# Patient Record
Sex: MALE | Race: WHITE | NOT HISPANIC OR LATINO | Employment: FULL TIME | URBAN - METROPOLITAN AREA
[De-identification: names, ages, dates, MRNs, and addresses within clinical notes are randomized per-mention and may not be internally consistent; named-entity substitution may affect disease eponyms.]

---

## 2018-07-10 ENCOUNTER — OFFICE VISIT (OUTPATIENT)
Dept: URGENT CARE | Facility: CLINIC | Age: 28
End: 2018-07-10
Payer: COMMERCIAL

## 2018-07-10 VITALS
DIASTOLIC BLOOD PRESSURE: 65 MMHG | TEMPERATURE: 98 F | HEART RATE: 54 BPM | RESPIRATION RATE: 18 BRPM | HEIGHT: 72 IN | OXYGEN SATURATION: 98 % | WEIGHT: 148 LBS | BODY MASS INDEX: 20.05 KG/M2 | SYSTOLIC BLOOD PRESSURE: 110 MMHG

## 2018-07-10 DIAGNOSIS — M25.571 ACUTE RIGHT ANKLE PAIN: Primary | ICD-10-CM

## 2018-07-10 PROCEDURE — 99204 OFFICE O/P NEW MOD 45 MIN: CPT | Mod: S$GLB,,, | Performed by: NURSE PRACTITIONER

## 2018-07-10 PROCEDURE — 3008F BODY MASS INDEX DOCD: CPT | Mod: CPTII,S$GLB,, | Performed by: NURSE PRACTITIONER

## 2018-07-10 NOTE — PATIENT INSTRUCTIONS
Ankle Fracture    You have an ankle fracture. This means that one or more of the bones that make up the ankle joint are broken. This causes pain, swelling, and sometimes bruising.  A fracture is treated with a splint or cast or special boot. It will take about 4 to 6 weeks for the fracture to heal. Surgery may be needed to fix severe injuries.  Home care  · You will be given a splint, cast or boot to prevent movement at the ankle joint. Unless you were told otherwise, use crutches or a walker. Dont weight on the injured leg until cleared by your healthcare provider to do so. Crutches and walkers can be rented at many pharmacies and surgical or orthopedic supply stores. Dont put weight on a splint. It will break.  · Keep your leg elevated to reduce pain and swelling. When sleeping, place a pillow under the injured leg. When sitting, support the injured leg so it is level with your waist. This is very important during the first 48 hours.  · Apply an ice pack over the injured area for no more than 15 to 20 minutes. Do this every 3 to 6 hours for the first 24 to 48 hours. Continue with ice packs 3 to 4 times a day for the next 2 days, then as needed to ease pain and swelling. To make an ice pack, put ice cubes in a plastic bag that seals at the top. Wrap the bag in a clean, thin towel or cloth. Never put ice or an ice pack directly on the skin. You can place the ice pack directly over the cast or splint. As the ice melts, be careful that the cast or splint doesnt get wet.  · Keep the cast, splint, or boot completely dry at all times. Bathe with your cast, splint, or boot out of the water, protected with 2 large plastic bags. Place 1 bag outside of the other. Tape each bag with duct tape at the top end. Water can still leak in. So it's best to keep the cast, splint, or boot away from water. If a boot or fiberglass cast or splint gets wet, dry it with a hair dryer on a cool setting.  · You may use over-the-counter  pain medicine to control pain, unless another pain medicine was prescribed. Talk with your provider beforeusing these medicines if you have chronic liver or kidney disease or ever had a stomach ulcer or GI bleeding.  Follow-up care  Follow up with your healthcare provider in 1 week, or as advised. This is to be sure the bone is healing properly. If you were given a splint, it may be changed to a cast at your follow-up visit.  If X-rays were taken, you will be told of any new findings that may affect your care.  When to seek medical advice  Call your healthcare provider right away if any of these occur:  · The plaster cast or splint becomes wet or soft  · The fiberglass cast or splint stays wet for more than 24 hours  · There is increased tightness, sore areas, or pain under the cast or splint  · Your toes become swollen, cold, blue, numb, or tingly  · The cast becomes loose  · The cast has a bad smell  · The cast develops cracks or breaks   Date Last Reviewed: 11/23/2015 © 2000-2017 [employer]. [address] [place] [address], [place] [address]. All rights reserved. This information is not intended as a substitute for professional medical care. Always follow your healthcare professional's instructions.

## 2018-07-10 NOTE — PROGRESS NOTES
Subjective:       Patient ID: Arthur Spaulding is a 27 y.o. male.    Vitals:  height is 6' (1.829 m) and weight is 67.1 kg (148 lb). His temperature is 98.1 °F (36.7 °C). His blood pressure is 110/65 and his pulse is 54 (abnormal). His respiration is 18 and oxygen saturation is 98%.     Chief Complaint: Ankle Pain    Pt c/o right ankle pain over 1 week; states playing volleyball & both feet went in front of him & landed on his butt. Unsure how he landed on right foot but has been unable to put weight on it since. Pt states he went out of town right after it happened & has been using a friend's crutches & walking boot. Is supposed to go out of town again       Ankle Pain    The incident occurred 5 to 7 days ago. [place] the park. The injury mechanism is unknown. The pain is present in the right ankle. The quality of the pain is described as aching and shooting. The pain is at a severity of 5/10. The pain is moderate. The pain has been intermittent since onset. Associated symptoms include an inability to bear weight and a loss of motion. He reports no foreign bodies present. The symptoms are aggravated by movement and weight bearing. He has tried NSAIDs and ice for the symptoms. The treatment provided mild relief.     Review of Systems   Constitution: Negative for chills and fever.   HENT: Negative for sore throat.    Eyes: Negative for blurred vision.   Cardiovascular: Negative for chest pain.   Respiratory: Negative for shortness of breath.    Skin: Negative for rash.   Musculoskeletal: Positive for joint pain and joint swelling. Negative for back pain.   Gastrointestinal: Negative for abdominal pain, diarrhea, nausea and vomiting.   Neurological: Negative for headaches.   Psychiatric/Behavioral: The patient is not nervous/anxious.        Objective:      Physical Exam   Constitutional: He is oriented to person, place, and time. He appears well-developed and well-nourished. He is cooperative.  Non-toxic  appearance. He does not appear ill. No distress.   HENT:   Head: Normocephalic and atraumatic.   Right Ear: Hearing, tympanic membrane and ear canal normal.   Left Ear: Hearing, tympanic membrane and ear canal normal.   Nose: No mucosal edema, rhinorrhea or nasal deformity. No epistaxis. Right sinus exhibits no maxillary sinus tenderness and no frontal sinus tenderness. Left sinus exhibits no maxillary sinus tenderness and no frontal sinus tenderness.   Mouth/Throat: Uvula is midline and mucous membranes are normal. No trismus in the jaw. Normal dentition. No uvula swelling. No posterior oropharyngeal erythema.   Eyes: Conjunctivae and lids are normal. Right eye exhibits no discharge. Left eye exhibits no discharge. No scleral icterus.   Sclera clear bilat   Neck: Trachea normal, normal range of motion, full passive range of motion without pain and phonation normal. Neck supple.   Cardiovascular: Normal rate, regular rhythm, normal heart sounds, intact distal pulses and normal pulses.    Pulmonary/Chest: Effort normal and breath sounds normal. No respiratory distress.   Abdominal: Soft. Normal appearance and bowel sounds are normal. He exhibits no distension, no pulsatile midline mass and no mass. There is no tenderness.   Musculoskeletal: He exhibits edema and tenderness. He exhibits no deformity.        Right ankle: He exhibits decreased range of motion, swelling and ecchymosis. Tenderness. Achilles tendon exhibits no pain and normal Pantoja's test results.        Feet:    Neurological: He is alert and oriented to person, place, and time. He exhibits normal muscle tone. Coordination normal.   Skin: Skin is warm, dry and intact. He is not diaphoretic. No pallor.   Psychiatric: He has a normal mood and affect. His speech is normal and behavior is normal. Judgment and thought content normal. Cognition and memory are normal.   Nursing note and vitals reviewed.      X-ray Ankle Complete Right    Result Date:  7/10/2018  EXAMINATION: XR ANKLE COMPLETE 3 VIEW RIGHT CLINICAL HISTORY: Pain in right ankle and joints of right foot TECHNIQUE: AP, lateral, and oblique images of the right ankle were performed. COMPARISON: None FINDINGS: There is a linear calcific density along the medial aspect of the tibial metaphysis.  This may represent an avulsion fracture though there is no minimal soft tissue swelling.  There is some soft tissue swelling about the lateral malleolus.  No fracture of the lateral malleolus seen.  Ankle mortise appears intact.     There is soft tissue swelling lateral more so than medial.  Curvilinear calcification abutting the medial distal tibial metaphysis may represent an avulsion fracture.  Mild cortical irregularity about the posterior malleolus on the lateral projection as well.  Findings concerning for nondisplaced fracture.  Orthopedic consultation suggested. Electronically signed by: Italo Canales MD Date:    07/10/2018 Time:    14:48    X-ray Foot Complete Right    Result Date: 7/10/2018  EXAMINATION: XR FOOT COMPLETE 3 VIEW RIGHT CLINICAL HISTORY: . Pain in right ankle and joints of right foot TECHNIQUE: AP, lateral, and oblique views of the right foot were performed. COMPARISON: None FINDINGS: There is soft tissue swelling about the lateral malleolus.  Bones are well mineralized and alignment is satisfactory.  No acute fracture seen.  Ankle films may be of benefit.     See above Electronically signed by: Italo Canales MD Date:    07/10/2018 Time:    14:17    Assessment:       1. Acute right ankle pain        Plan:       Possible fracture vs tear  Patient Instructions     Ankle Fracture    You have an ankle fracture. This means that one or more of the bones that make up the ankle joint are broken. This causes pain, swelling, and sometimes bruising.  A fracture is treated with a splint or cast or special boot. It will take about 4 to 6 weeks for the fracture to heal. Surgery may be needed to fix  severe injuries.  Home care  · You will be given a splint, cast or boot to prevent movement at the ankle joint. Unless you were told otherwise, use crutches or a walker. Dont weight on the injured leg until cleared by your healthcare provider to do so. Crutches and walkers can be rented at many pharmacies and surgical or orthopedic supply stores. Dont put weight on a splint. It will break.  · Keep your leg elevated to reduce pain and swelling. When sleeping, place a pillow under the injured leg. When sitting, support the injured leg so it is level with your waist. This is very important during the first 48 hours.  · Apply an ice pack over the injured area for no more than 15 to 20 minutes. Do this every 3 to 6 hours for the first 24 to 48 hours. Continue with ice packs 3 to 4 times a day for the next 2 days, then as needed to ease pain and swelling. To make an ice pack, put ice cubes in a plastic bag that seals at the top. Wrap the bag in a clean, thin towel or cloth. Never put ice or an ice pack directly on the skin. You can place the ice pack directly over the cast or splint. As the ice melts, be careful that the cast or splint doesnt get wet.  · Keep the cast, splint, or boot completely dry at all times. Bathe with your cast, splint, or boot out of the water, protected with 2 large plastic bags. Place 1 bag outside of the other. Tape each bag with duct tape at the top end. Water can still leak in. So it's best to keep the cast, splint, or boot away from water. If a boot or fiberglass cast or splint gets wet, dry it with a hair dryer on a cool setting.  · You may use over-the-counter pain medicine to control pain, unless another pain medicine was prescribed. Talk with your provider beforeusing these medicines if you have chronic liver or kidney disease or ever had a stomach ulcer or GI bleeding.  Follow-up care  Follow up with your healthcare provider in 1 week, or as advised. This is to be sure the bone is  healing properly. If you were given a splint, it may be changed to a cast at your follow-up visit.  If X-rays were taken, you will be told of any new findings that may affect your care.  When to seek medical advice  Call your healthcare provider right away if any of these occur:  · The plaster cast or splint becomes wet or soft  · The fiberglass cast or splint stays wet for more than 24 hours  · There is increased tightness, sore areas, or pain under the cast or splint  · Your toes become swollen, cold, blue, numb, or tingly  · The cast becomes loose  · The cast has a bad smell  · The cast develops cracks or breaks   Date Last Reviewed: 11/23/2015  © 8616-4365 [employer]. [address] [place] [address]. All rights reserved. This information is not intended as a substitute for professional medical care. Always follow your healthcare professional's instructions.              Acute right ankle pain  -     X-Ray Foot Complete Right; Future; Expected date: 07/10/2018  -     Ambulatory referral to Orthopedics  -     X-Ray Ankle Complete Right; Future; Expected date: 07/10/2018  -     [facility] - OTHER

## 2018-07-12 ENCOUNTER — TELEPHONE (OUTPATIENT)
Dept: URGENT CARE | Facility: CLINIC | Age: 28
End: 2018-07-12

## 2018-07-12 NOTE — TELEPHONE ENCOUNTER
Pt returned my call. States he spoke with ortho & has an appointment set up with them for follow-up.

## 2018-07-24 ENCOUNTER — OFFICE VISIT (OUTPATIENT)
Dept: ORTHOPEDICS | Facility: CLINIC | Age: 28
End: 2018-07-24
Payer: COMMERCIAL

## 2018-07-24 ENCOUNTER — HOSPITAL ENCOUNTER (OUTPATIENT)
Dept: RADIOLOGY | Facility: HOSPITAL | Age: 28
Discharge: HOME | End: 2018-07-24
Attending: PHYSICIAN ASSISTANT
Payer: COMMERCIAL

## 2018-07-24 VITALS — HEIGHT: 72 IN

## 2018-07-24 DIAGNOSIS — S93.401A SPRAIN OF RIGHT ANKLE, UNSPECIFIED LIGAMENT, INITIAL ENCOUNTER: ICD-10-CM

## 2018-07-24 DIAGNOSIS — M25.571 RIGHT ANKLE PAIN, UNSPECIFIED CHRONICITY: ICD-10-CM

## 2018-07-24 DIAGNOSIS — M25.571 RIGHT ANKLE PAIN, UNSPECIFIED CHRONICITY: Primary | ICD-10-CM

## 2018-07-24 PROCEDURE — 99999 PR PBB SHADOW E&M-EST. PATIENT-LVL III: CPT | Mod: PBBFAC,,, | Performed by: PHYSICIAN ASSISTANT

## 2018-07-24 PROCEDURE — 73610 X-RAY EXAM OF ANKLE: CPT | Mod: 26,RT,, | Performed by: RADIOLOGY

## 2018-07-24 PROCEDURE — 73610 X-RAY EXAM OF ANKLE: CPT | Mod: TC,RT

## 2018-07-24 PROCEDURE — 99203 OFFICE O/P NEW LOW 30 MIN: CPT | Mod: S$GLB,,, | Performed by: PHYSICIAN ASSISTANT

## 2018-07-24 RX ORDER — MELOXICAM 15 MG/1
15 TABLET ORAL DAILY
Qty: 30 TABLET | Refills: 0 | Status: SHIPPED | OUTPATIENT
Start: 2018-07-24 | End: 2018-08-23

## 2018-07-24 NOTE — LETTER
July 24, 2018      So Melissa, NP  [address] [address]           [facility]  [address], [address]  [facility] [address]  Phone: [phone]          Patient: Arthur Spaulding   MR Number: [id number]   YOB: 1990   Date of Visit: 7/24/2018       Dear So Melissa:    Thank you for referring Arthur Spaulding to me for evaluation. Attached you will find relevant portions of my assessment and plan of care.    If you have questions, please do not hesitate to call me. I look forward to following Arthur Spaulding along with you.    Sincerely,    Jessica Norris PA-C    Enclosure  CC:  No Recipients    If you would like to receive this communication electronically, please contact [email or web address] or [phone] to request more information on [facility] access.    For providers and/or their staff who would like to refer a patient to [facility], please contact us through our one-stop-shop provider referral line, [facility], at [phone].    If you feel you have received this communication in error or would no longer like to receive these types of communications, please e-mail [email or web address]

## 2018-07-24 NOTE — PROGRESS NOTES
Subjective:      Patient ID: Arthur Spaulding is a 27 y.o. male.    Chief Complaint: No chief complaint on file.    HPI  27 year old male presents with chief complaint of right ankle pain since 7/4/18. He was playing volleyball when he jumped up and when he landed his foot may have twisted. He had pain with WB and swelling. He went to urgent care about 5 days later and had x-rays taken. He was told there was a fracture. He has been WBAT in a boot and uses crutches prn. Pain is at the lateral ankle. It is worse when he turns his foot or if he is on it for too long. Tylenol and ibuprofen prn give some relief. He says his pain and swelling have gotten way better.   Review of Systems   Constitution: Negative for chills, fever and night sweats.   Cardiovascular: Negative for chest pain.   Respiratory: Negative for cough and shortness of breath.    Hematologic/Lymphatic: Does not bruise/bleed easily.   Skin: Negative for color change.   Gastrointestinal: Negative for heartburn.   Genitourinary: Negative for dysuria.   Neurological: Negative for numbness and paresthesias.   Psychiatric/Behavioral: Negative for altered mental status.   Allergic/Immunologic: Negative for persistent infections.         Objective:            General    Vitals reviewed.  Constitutional: He is oriented to person, place, and time. He appears well-developed and well-nourished.   Cardiovascular: Normal rate.    Neurological: He is alert and oriented to person, place, and time.         Right Ankle/Foot Exam     Inspection   Erythema: absent    Tenderness   The patient is tender to palpation of the ATF.    Range of Motion   Ankle Joint   Dorsiflexion: abnormal   Plantar flexion: abnormal   Subtalar Joint   Inversion: abnormal   Eversion: abnormal   Pantoja Test:  negative    Tests   Squeeze Test: negative    Other   Sensation: normal    Comments:  Mild swelling at ankle.         Vascular Exam     Right Pulses  Dorsalis Pedis:      2+              X-ray:  ordered and reviewed by myself. No acute fx or dislocation seen.         Assessment:       Encounter Diagnoses   Name Primary?    Right ankle pain, unspecified chronicity Yes    Sprain of right ankle, unspecified ligament, initial encounter           Plan:       Patient will continue WBAT in the boot. Work on ROM as tolerated. Ice and elevate. Prescription for mobic sent to pharmacy. RTC in 2 weeks for re-evaluation.

## 2019-03-18 ENCOUNTER — OFFICE VISIT (OUTPATIENT)
Dept: URGENT CARE | Facility: CLINIC | Age: 29
End: 2019-03-18

## 2019-03-18 VITALS
RESPIRATION RATE: 18 BRPM | HEIGHT: 72 IN | TEMPERATURE: 98 F | BODY MASS INDEX: 20.05 KG/M2 | OXYGEN SATURATION: 100 % | HEART RATE: 65 BPM | SYSTOLIC BLOOD PRESSURE: 121 MMHG | WEIGHT: 148 LBS | DIASTOLIC BLOOD PRESSURE: 76 MMHG

## 2019-03-18 DIAGNOSIS — L42 PITYRIASIS ROSEA: Primary | ICD-10-CM

## 2019-03-18 PROCEDURE — 99214 OFFICE O/P EST MOD 30 MIN: CPT | Mod: S$GLB,,, | Performed by: NURSE PRACTITIONER

## 2019-03-18 PROCEDURE — 99214 PR OFFICE/OUTPT VISIT, EST, LEVL IV, 30-39 MIN: ICD-10-PCS | Mod: S$GLB,,, | Performed by: NURSE PRACTITIONER

## 2019-03-18 RX ORDER — LEVOCETIRIZINE DIHYDROCHLORIDE 5 MG/1
5 TABLET, FILM COATED ORAL NIGHTLY
Qty: 30 TABLET | Refills: 0 | Status: SHIPPED | OUTPATIENT
Start: 2019-03-18 | End: 2022-12-24

## 2019-03-18 RX ORDER — HYDROXYZINE HYDROCHLORIDE 25 MG/1
25 TABLET, FILM COATED ORAL 3 TIMES DAILY
Qty: 30 TABLET | Refills: 0 | Status: SHIPPED | OUTPATIENT
Start: 2019-03-18 | End: 2021-07-20

## 2019-03-18 RX ORDER — METHYLPREDNISOLONE 4 MG/1
TABLET ORAL
Qty: 21 TABLET | Refills: 0 | Status: SHIPPED | OUTPATIENT
Start: 2019-03-18 | End: 2021-07-20

## 2019-03-18 RX ORDER — HYDROCORTISONE 25 MG/G
CREAM TOPICAL 2 TIMES DAILY
Qty: 3.5 G | Refills: 1 | Status: SHIPPED | OUTPATIENT
Start: 2019-03-18 | End: 2021-07-20

## 2019-03-18 NOTE — PATIENT INSTRUCTIONS
Understanding Pityriasis Rosea    Pityriasis rosea is a type of skin rash. It starts with one large round or oval scaly patch called the herald patch, and then causes many more small patches. The rash most often appears on the chest, back, and belly. It can take 1 to 2 months to go away. But once its gone, it doesnt come back.  How to say it  [id number]   What causes pityriasis rosea?  The cause is not yet known but experts think it may be from a virus. The rash happens most often in people ages 10 to 35, and in pregnant women. If you are pregnant, make sure to tell your healthcare provider about your rash.  Symptoms of pityriasis rosea  In some people, the rash shows up 1 to 2 weeks after symptoms such as headache, sore throat, nausea, stuffy nose, and fever. The rash often starts with one large scaly patch in the shape of a [place] or oval. The patch may be pink or red if you have pale skin. It may be purple, brown, or gray if you have darker skin. It can be 1 to 2 inches wide or larger. It usually appears on the chest or back. This is called a herald or mother patch.  Smaller patches then show up in 1 to 2 weeks on the chest, back, belly, arms, and legs. It can also show up on the neck and face. The rash can form the shape of a [place] tree on your back. The patches may itch, especially if your skin gets warmer during exercise or a hot shower. You may also feel tired and achy.  Treatment for pityriasis rosea  The rash should go away without treatment, but it can take 4 to 8 weeks or longer. Once the rash goes away, it doesnt come back.  You can treat your itching with any of these:  · Corticosteroid cream or ointment. You can apply this medicine to the rash 2 to 3 times a day, for up to 3 weeks.  · Calamine lotion. This is a pink, watery lotion that can help stop itching.  · Antihistamine. This medicine can help reduce itching. You can put it on the skin as a cream or take it by mouth as a  pill.  · Other anti-itch lotion or cream. Ask your healthcare provider about other anti-itch lotion or cream that can help relieve itching. He or she may prescribe a stronger medicine if drugstore medicine isnt helping you.  If you have severe symptoms, your healthcare provider may treat you with any of the below:  · Prednisone. This is an oral steroid medicine. It can help relieve severe itching if needed.  · Acyclovir. This is a type of anti-virus medicine. It may help the rash go away sooner in some people.  · Ultraviolet light treatment. Exposing the skin to ultraviolet light in the first week can help lessen symptoms.  When to call your healthcare provider  Call your healthcare provider right away if you have any of these:  · New symptoms  · Rash that lasts for more than 3 months  · Symptoms that dont get better in 1 to 2 months, or get worse   Date Last Reviewed: 5/1/2016  © 8734-5320 [employer]. [address] [place] [address]. All rights reserved. This information is not intended as a substitute for professional medical care. Always follow your healthcare professional's instructions.        Self-Care for Skin Rashes     Pat your skin dry. Do not rub.     When your skin reacts to a substance your body is sensitive to, it can cause a rash. You can treat most rashes at home by keeping the skin clean and dry. Many rashes may get better on their own within 2 to 3 days. You may need medical attention if your rash itches, drains, or hurts, particularly if the rash is getting worse.  What can cause a skin rash?  · Sun poisoning, caused by too much exposure to the sun  · An irritant or allergic reaction to a certain type of food, plant, or chemical, such as  shellfish, poison ivy, and or cleaning products  · An infection caused by a fungus (ringworm), virus (chickenpox), or bacteria (strep)  · Bites or infestation caused by insects or pests, such as ticks, lice, or mites  · Dry skin, which  is often seen during the winter months and in older people  How can I control itching and skin damage?  · Take soothing lukewarm baths in a colloidal oatmeal product. You can buy this at the [employer].  · Do your best not to scratch. Clip fingernails short, especially in young children, to reduce skin damage if scratching does occur.  · Use moisturizing skin lotion instead of scratching your dry skin.  · Use sunscreen whenever going out into direct sun.  · Use only mild cleansing agents whenever possible.  · Wash with mild, nonirritating soap and warm water.  · Wear clothing that breathes, such as cotton shirts or canvas shoes.  · If fluid is seeping from the rash, cover it loosely with clean gauze to absorb the discharge.  · Many rashes are contagious. Prevent the rash from spreading to others by washing your hands often before or after touching others with any skin rash.  Use medicine  · Antihistamines such as diphenhydramine can help control itching. But use with caution because they can make you drowsy.  · Using over-the-counter hydrocortisone cream on small rashes may help reduce swelling and itching  · Most over-the-counter antifungal medicines can treat athletes foot and many other fungal infections of the skin.  Check with your healthcare provider  Call your healthcare provider if:  · You were told that you have a fungal infection on your skin to make sure you have the correct type of medicine.  · You have questions or concerns about medicines or their side effects.     Call 911  Call 911 if either of these occur:  · Your tongue or lips start to swell  · You have difficulty breathing      Call your healthcare provider  Call your healthcare provider if any of these occur:  · Temperature of more than 101.0°F (38.3°C), or as directed  · Sore throat, a cough, or unusual fatigue  · Red, oozy, or painful rash gets worse. These are signs of infection.  · Rash covers your face, genitals, or most of your  body  · Crusty sores or red rings that begin to spread  · You were exposed to someone who has a contagious rash, such as scabies or lice.  · Red bulls-eye rash with a white center (a sign of Lyme disease)  · You were told that you have resistant bacteria (MRSA) on your skin.   Date Last Reviewed: 5/12/2015  © 6261-2041 [employer]. [address] [place] [address], [place] [address]. All rights reserved. This information is not intended as a substitute for professional medical care. Always follow your healthcare professional's instructions.        Pityriasis Rosea  This is a harmless non-contagious rash. The exact cause is unknown. It is not an allergic reaction, and does not seem to be caused by a viral or fungal infection. Although anyone can get it, it is most often seen in children and young adults ages 10 to 35.  Pityriasis usually resolves on its own without treatment in 2 weeks.  In some people it may take 6 to 8 weeks to clear up.   Home care  · For dry skin, use a moisturizing cream. For itchiness, use 1% hydrocortisone cream (no prescription needed) or calamine lotion 2 to 3 times a day.  · Exposure to natural sunlight helps some people. It may help fade the rash, but doesn't seem to help the itching. Don't overdo it in the sun, as your skin may be more sensitive than usual. You dont want to burn yourself. Artificial sun lamps, sun beds, and tanning salons are not recommended.  · This condition is not contagious. Your child may attend  or school while the rash is present.  Medicines  Talk with your healthcare provider before using any medicines. All medicines have side effects.  · Medicines will not get rid of the rash.   · Moisturizing skin creams may help.  · Antihistamines may help with itching, but they can make you sleepy.  · Topical steroids are sometimes used.  Follow-up care  Follow up with your healthcare provider, or as advised. Call your provider if the itching is not controlled by  the above suggestions, or if the rash lasts longer than 3 months.  When to seek medical advice  Call your healthcare provider right away if any of these occur:  · You develop a rash and are pregnant  · Severe itching  · Signs of infection in the skin (increasing redness, drainage of pus, yellow-brown scabs)  · Fever or other symptoms of a new illness  Date Last Reviewed: 8/1/2016  © 0333-7999 [employer]. [address] [place] [address], [place] [address]. All rights reserved. This information is not intended as a substitute for professional medical care. Always follow your healthcare professional's instructions.      Please return here or go to the Emergency Department for any concerns or worsening of condition.  If you were prescribed antibiotics, please take them to completion.  If you were prescribed a narcotic medication, do not drive or operate heavy equipment or machinery while taking these medications.  Please follow up with your primary care doctor or specialist as needed.    If you  smoke, please stop smoking.

## 2019-03-18 NOTE — LETTER
March 18, 2019      [facility] [address] [place] [address]  Phone: [phone]  Fax: [phone]       Patient: Arthur Spaulding   YOB: 1990  Date of Visit: 03/18/2019    To Whom It May Concern:    Baldev Spaulding  was at [facility] on 03/18/2019. He may return to work/school on 03/19/19 with no restrictions. If you have any questions or concerns, or if I can be of further assistance, please do not hesitate to contact me.    Sincerely,    Sergio Lyn NP

## 2019-03-18 NOTE — PROGRESS NOTES
Subjective:       Patient ID: Arthur Spaulding is a 28 y.o. male.    Vitals:  height is 6' (1.829 m) and weight is 67.1 kg (148 lb). His temperature is 97.9 °F (36.6 °C). His blood pressure is 121/76 and his pulse is 65. His respiration is 18 and oxygen saturation is 100%.     Chief Complaint: Rash (started 2-3 months)    Mother patch on his right chest      Rash   This is a recurrent problem. Episode onset: 2-3 months. The problem is unchanged. The affected locations include the chest and back. The rash is characterized by itchiness. He was exposed to nothing. Pertinent negatives include no cough, fever or sore throat. Past treatments include nothing.       Constitution: Negative for chills and fever.   HENT: Negative for facial swelling and sore throat.    Neck: Negative for painful lymph nodes.   Eyes: Negative for eye itching and eyelid swelling.   Respiratory: Negative for cough.    Musculoskeletal: Negative for joint pain and joint swelling.   Skin: Positive for rash. Negative for color change, pale, wound, abrasion, laceration, lesion, skin thickening/induration, puncture wound, erythema, abscess, avulsion and hives.   Allergic/Immunologic: Positive for itching. Negative for environmental allergies, immunocompromised state and hives.   Hematologic/Lymphatic: Negative for swollen lymph nodes.       Objective:      Physical Exam   Constitutional: He is oriented to person, place, and time. He appears well-developed and well-nourished.   HENT:   Head: Normocephalic and atraumatic. Head is without abrasion, without contusion and without laceration.   Right Ear: External ear normal.   Left Ear: External ear normal.   Nose: Nose normal.   Mouth/Throat: Oropharynx is clear and moist.   Eyes: Conjunctivae, EOM and lids are normal. Pupils are equal, round, and reactive to light.   Neck: Trachea normal, full passive range of motion without pain and phonation normal. Neck supple.   Cardiovascular: Normal rate, regular rhythm  and normal heart sounds.   Pulmonary/Chest: Effort normal and breath sounds normal. No stridor. No respiratory distress.   Musculoskeletal: Normal range of motion.   Neurological: He is alert and oriented to person, place, and time.   Skin: Skin is warm, dry and intact. Capillary refill takes less than 2 seconds. Rash noted. No abrasion, no bruising, no burn, no ecchymosis, no laceration and no lesion noted. No erythema.        Psychiatric: He has a normal mood and affect. His speech is normal and behavior is normal. Judgment and thought content normal. Cognition and memory are normal.   Nursing note and vitals reviewed.      Assessment:       1. Pityriasis rosea        Plan:         Pityriasis rosea  -     Ambulatory referral to Dermatology    Other orders  -     methylPREDNISolone (MEDROL DOSEPACK) 4 mg tablet; use as directed  Dispense: 21 tablet; Refill: 0  -     hydrOXYzine HCl (ATARAX) 25 MG tablet; Take 1 tablet (25 mg total) by mouth 3 (three) times daily.  Dispense: 30 tablet; Refill: 0  -     hydrocortisone 2.5 % cream; Apply topically 2 (two) times daily.  Dispense: 3.5 g; Refill: 1  -     levocetirizine (XYZAL) 5 MG tablet; Take 1 tablet (5 mg total) by mouth every evening.  Dispense: 30 tablet; Refill: 0  -     calamine lotion; Apply topically as needed.; Refill: 0        Understanding Pityriasis Rosea    Pityriasis rosea is a type of skin rash. It starts with one large round or oval scaly patch called the herald patch, and then causes many more small patches. The rash most often appears on the chest, back, and belly. It can take 1 to 2 months to go away. But once its gone, it doesnt come back.  How to say it  [id number]-sis RO-[id number]   What causes pityriasis rosea?  The cause is not yet known but experts think it may be from a virus. The rash happens most often in people ages 10 to 35, and in pregnant women. If you are pregnant, make sure to tell your healthcare provider about your rash.  Symptoms  of pityriasis rosea  In some people, the rash shows up 1 to 2 weeks after symptoms such as headache, sore throat, nausea, stuffy nose, and fever. The rash often starts with one large scaly patch in the shape of a [place] or oval. The patch may be pink or red if you have pale skin. It may be purple, brown, or gray if you have darker skin. It can be 1 to 2 inches wide or larger. It usually appears on the chest or back. This is called a herald or mother patch.  Smaller patches then show up in 1 to 2 weeks on the chest, back, belly, arms, and legs. It can also show up on the neck and face. The rash can form the shape of a [place] tree on your back. The patches may itch, especially if your skin gets warmer during exercise or a hot shower. You may also feel tired and achy.  Treatment for pityriasis rosea  The rash should go away without treatment, but it can take 4 to 8 weeks or longer. Once the rash goes away, it doesnt come back.  You can treat your itching with any of these:  · Corticosteroid cream or ointment. You can apply this medicine to the rash 2 to 3 times a day, for up to 3 weeks.  · Calamine lotion. This is a pink, watery lotion that can help stop itching.  · Antihistamine. This medicine can help reduce itching. You can put it on the skin as a cream or take it by mouth as a pill.  · Other anti-itch lotion or cream. Ask your healthcare provider about other anti-itch lotion or cream that can help relieve itching. He or she may prescribe a stronger medicine if drugstore medicine isnt helping you.  If you have severe symptoms, your healthcare provider may treat you with any of the below:  · Prednisone. This is an oral steroid medicine. It can help relieve severe itching if needed.  · Acyclovir. This is a type of anti-virus medicine. It may help the rash go away sooner in some people.  · Ultraviolet light treatment. Exposing the skin to ultraviolet light in the first week can help lessen symptoms.  When to call  your healthcare provider  Call your healthcare provider right away if you have any of these:  · New symptoms  · Rash that lasts for more than 3 months  · Symptoms that dont get better in 1 to 2 months, or get worse   Date Last Reviewed: 5/1/2016  © 7243-6569 [employer]. [address] [place] [address]. All rights reserved. This information is not intended as a substitute for professional medical care. Always follow your healthcare professional's instructions.        Self-Care for Skin Rashes     Pat your skin dry. Do not rub.     When your skin reacts to a substance your body is sensitive to, it can cause a rash. You can treat most rashes at home by keeping the skin clean and dry. Many rashes may get better on their own within 2 to 3 days. You may need medical attention if your rash itches, drains, or hurts, particularly if the rash is getting worse.  What can cause a skin rash?  · Sun poisoning, caused by too much exposure to the sun  · An irritant or allergic reaction to a certain type of food, plant, or chemical, such as  shellfish, poison ivy, and or cleaning products  · An infection caused by a fungus (ringworm), virus (chickenpox), or bacteria (strep)  · Bites or infestation caused by insects or pests, such as ticks, lice, or mites  · Dry skin, which is often seen during the winter months and in older people  How can I control itching and skin damage?  · Take soothing lukewarm baths in a colloidal oatmeal product. You can buy this at the [employer].  · Do your best not to scratch. Clip fingernails short, especially in young children, to reduce skin damage if scratching does occur.  · Use moisturizing skin lotion instead of scratching your dry skin.  · Use sunscreen whenever going out into direct sun.  · Use only mild cleansing agents whenever possible.  · Wash with mild, nonirritating soap and warm water.  · Wear clothing that breathes, such as cotton shirts or canvas shoes.  · If  fluid is seeping from the rash, cover it loosely with clean gauze to absorb the discharge.  · Many rashes are contagious. Prevent the rash from spreading to others by washing your hands often before or after touching others with any skin rash.  Use medicine  · Antihistamines such as diphenhydramine can help control itching. But use with caution because they can make you drowsy.  · Using over-the-counter hydrocortisone cream on small rashes may help reduce swelling and itching  · Most over-the-counter antifungal medicines can treat athletes foot and many other fungal infections of the skin.  Check with your healthcare provider  Call your healthcare provider if:  · You were told that you have a fungal infection on your skin to make sure you have the correct type of medicine.  · You have questions or concerns about medicines or their side effects.     Call 911  Call 911 if either of these occur:  · Your tongue or lips start to swell  · You have difficulty breathing      Call your healthcare provider  Call your healthcare provider if any of these occur:  · Temperature of more than 101.0°F (38.3°C), or as directed  · Sore throat, a cough, or unusual fatigue  · Red, oozy, or painful rash gets worse. These are signs of infection.  · Rash covers your face, genitals, or most of your body  · Crusty sores or red rings that begin to spread  · You were exposed to someone who has a contagious rash, such as scabies or lice.  · Red bulls-eye rash with a white center (a sign of Lyme disease)  · You were told that you have resistant bacteria (MRSA) on your skin.   Date Last Reviewed: 5/12/2015  © 9112-1003 [employer]. [address] [place] [address]. All rights reserved. This information is not intended as a substitute for professional medical care. Always follow your healthcare professional's instructions.        Pityriasis Rosea  This is a harmless non-contagious rash. The exact cause is unknown. It is  not an allergic reaction, and does not seem to be caused by a viral or fungal infection. Although anyone can get it, it is most often seen in children and young adults ages 10 to 35.  Pityriasis usually resolves on its own without treatment in 2 weeks.  In some people it may take 6 to 8 weeks to clear up.   Home care  · For dry skin, use a moisturizing cream. For itchiness, use 1% hydrocortisone cream (no prescription needed) or calamine lotion 2 to 3 times a day.  · Exposure to natural sunlight helps some people. It may help fade the rash, but doesn't seem to help the itching. Don't overdo it in the sun, as your skin may be more sensitive than usual. You dont want to burn yourself. Artificial sun lamps, sun beds, and tanning salons are not recommended.  · This condition is not contagious. Your child may attend  or school while the rash is present.  Medicines  Talk with your healthcare provider before using any medicines. All medicines have side effects.  · Medicines will not get rid of the rash.   · Moisturizing skin creams may help.  · Antihistamines may help with itching, but they can make you sleepy.  · Topical steroids are sometimes used.  Follow-up care  Follow up with your healthcare provider, or as advised. Call your provider if the itching is not controlled by the above suggestions, or if the rash lasts longer than 3 months.  When to seek medical advice  Call your healthcare provider right away if any of these occur:  · You develop a rash and are pregnant  · Severe itching  · Signs of infection in the skin (increasing redness, drainage of pus, yellow-brown scabs)  · Fever or other symptoms of a new illness  Date Last Reviewed: 8/1/2016  © 1918-4219 [employer]. [address] [place] [address], [place] [address]. All rights reserved. This information is not intended as a substitute for professional medical care. Always follow your healthcare professional's instructions.      Please return here  or go to the Emergency Department for any concerns or worsening of condition.  If you were prescribed antibiotics, please take them to completion.  If you were prescribed a narcotic medication, do not drive or operate heavy equipment or machinery while taking these medications.  Please follow up with your primary care doctor or specialist as needed.    If you  smoke, please stop smoking.

## 2020-11-04 ENCOUNTER — OFFICE VISIT (OUTPATIENT)
Dept: URGENT CARE | Facility: CLINIC | Age: 30
End: 2020-11-04
Payer: COMMERCIAL

## 2020-11-04 VITALS
SYSTOLIC BLOOD PRESSURE: 119 MMHG | TEMPERATURE: 98 F | BODY MASS INDEX: 20.05 KG/M2 | OXYGEN SATURATION: 96 % | DIASTOLIC BLOOD PRESSURE: 96 MMHG | HEART RATE: 76 BPM | WEIGHT: 148 LBS | RESPIRATION RATE: 16 BRPM | HEIGHT: 72 IN

## 2020-11-04 DIAGNOSIS — J40 BRONCHITIS: ICD-10-CM

## 2020-11-04 DIAGNOSIS — R05.9 COUGH: Primary | ICD-10-CM

## 2020-11-04 LAB
CTP QC/QA: YES
SARS-COV-2 RDRP RESP QL NAA+PROBE: NEGATIVE

## 2020-11-04 PROCEDURE — U0002: ICD-10-PCS | Mod: QW,S$GLB,, | Performed by: STUDENT IN AN ORGANIZED HEALTH CARE EDUCATION/TRAINING PROGRAM

## 2020-11-04 PROCEDURE — U0002 COVID-19 LAB TEST NON-CDC: HCPCS | Mod: QW,S$GLB,, | Performed by: STUDENT IN AN ORGANIZED HEALTH CARE EDUCATION/TRAINING PROGRAM

## 2020-11-04 PROCEDURE — 99214 PR OFFICE/OUTPT VISIT, EST, LEVL IV, 30-39 MIN: ICD-10-PCS | Mod: S$GLB,,, | Performed by: STUDENT IN AN ORGANIZED HEALTH CARE EDUCATION/TRAINING PROGRAM

## 2020-11-04 PROCEDURE — 99214 OFFICE O/P EST MOD 30 MIN: CPT | Mod: S$GLB,,, | Performed by: STUDENT IN AN ORGANIZED HEALTH CARE EDUCATION/TRAINING PROGRAM

## 2020-11-04 RX ORDER — ALBUTEROL SULFATE 90 UG/1
2-4 AEROSOL, METERED RESPIRATORY (INHALATION) EVERY 6 HOURS PRN
Qty: 8 G | Refills: 0 | Status: SHIPPED | OUTPATIENT
Start: 2020-11-04 | End: 2020-12-04

## 2020-11-04 RX ORDER — MONTELUKAST SODIUM 10 MG/1
10 TABLET ORAL NIGHTLY
COMMUNITY

## 2020-11-04 NOTE — PROGRESS NOTES
Subjective:       Patient ID: Arthur Spaulding is a 30 y.o. male.    Vitals:  height is 6' (1.829 m) and weight is 67.1 kg (148 lb). His temperature is 97.6 °F (36.4 °C). His blood pressure is 119/96 (abnormal) and his pulse is 76. His respiration is 16 and oxygen saturation is 96%.     Chief Complaint: Cough    Pt presents for cough/wheezing with COVID concerns. Reports ~5d of cough with associated intermittent wheezing, congestion, and diarrhea. Has had to use rescue inhaler over the weekend but states it ran out Monday. Denied f/c, n/v, abd pain, or sick contacts.    Cough  This is a new problem. The current episode started in the past 7 days. The problem has been unchanged. The problem occurs hourly. The cough is non-productive. Associated symptoms include shortness of breath (with wheezing) and wheezing. Pertinent negatives include no chest pain, chills, ear congestion, ear pain, eye redness, fever, headaches, myalgias, rash or sore throat. Nothing aggravates the symptoms. He has tried a beta-agonist inhaler (Xyzal) for the symptoms. The treatment provided mild relief. His past medical history is significant for asthma.       Constitution: Negative for chills, sweating, fatigue and fever.   HENT: Positive for congestion. Negative for ear pain, sinus pain, sinus pressure, sore throat and voice change.    Neck: Negative for neck pain and painful lymph nodes.   Cardiovascular: Negative for chest pain, palpitations and sob on exertion.   Eyes: Negative for eye discharge, eye itching and eye redness.   Respiratory: Positive for cough, shortness of breath (with wheezing), wheezing and asthma. Negative for chest tightness, sputum production and stridor.    Gastrointestinal: Positive for diarrhea. Negative for abdominal pain, nausea and vomiting.   Musculoskeletal: Negative for joint pain, joint swelling and muscle ache.   Skin: Negative for color change, rash and lesion.   Allergic/Immunologic: Positive for asthma.  Negative for seasonal allergies.   Neurological: Negative for dizziness, light-headedness and headaches.   Hematologic/Lymphatic: Negative for swollen lymph nodes.   Psychiatric/Behavioral: Negative for confusion, agitation and sleep disturbance.       Objective:      Physical Exam   Constitutional: He is oriented to person, place, and time. He appears well-developed. He does not appear ill. No distress.   HENT:   Head: Normocephalic and atraumatic.   Ears:   Right Ear: External ear normal.   Left Ear: External ear normal.   Eyes: Conjunctivae and EOM are normal. Right eye exhibits no discharge. Left eye exhibits no discharge. No scleral icterus.   Neck: Normal range of motion. Neck supple.   Cardiovascular: Normal rate, regular rhythm and normal heart sounds. Exam reveals no gallop and no friction rub.   No murmur heard.  Pulmonary/Chest: Effort normal and breath sounds normal. No respiratory distress. He has no wheezes. He has no rhonchi. He has no rales.   Abdominal: Soft. Bowel sounds are normal. He exhibits no distension. There is no abdominal tenderness.   Musculoskeletal: Normal range of motion.   Neurological: He is alert and oriented to person, place, and time. No cranial nerve deficit (CN II-XII grossly intact).   Skin: Skin is warm, dry and no rash. Psychiatric: His behavior is normal. Judgment and thought content normal.   Nursing note and vitals reviewed.    Recent Lab Results       11/04/20  1705         Acceptable Yes     SARS-CoV-2 RNA, Amplification, Qual Negative             Assessment:       1. Cough    2. Bronchitis        Plan:         Cough  -     POCT COVID-19 Rapid Screening  - discussed negative result with patient. Counseled Symptomatic patient without known exposure to continue home quarantine/isolation per CDC guidelines in event of false negative rapid COVID test    Bronchitis  -     albuterol (PROVENTIL/VENTOLIN HFA) 90 mcg/actuation inhaler; Inhale 2-4 puffs into the  lungs every 6 (six) hours as needed for Wheezing or Shortness of Breath.  Dispense: 8 g; Refill: 0    Results, medications and diagnosis reviewed with patient, questions answered, and return precautions given    Follow up if symptoms worsen or fail to improve.    Gunner Arguello MD/MPH  [facility]  [facility]

## 2020-11-04 NOTE — PATIENT INSTRUCTIONS
You may leave home and/or return to work when the following conditions are met:   24 hours fever free without fever-reducing medications AND   Improved symptoms    Additional instructions:  · Social distance per your local guidelines  · Call ahead before visiting your doctor.  · Wear a facemask when around others who do not live in your household.  · Cover your coughs and sneezes.  · Wash your hands often with soap and water; hand  can be used, too.

## 2020-11-04 NOTE — LETTER
[address] [place] [address] ? [place], [address] ? Phone [phone] ? Fax [phone]           Return to Work/School    Patient: Arthur Spaulding  YOB: 1990   Date: 11/04/2020      To Whom It May Concern:     Arthur Spaulding was in contact with/seen in my office on 11/04/2020. COVID-19 is present in our communities across the state. Not all patients are eligible or appropriate to be tested. In this situation, your employee meets the following criteria:     Arthur Spaulding has met the criteria for COVID-19 testing and has a NEGATIVE result. The employee can return to work once their symptoms have improved and they are without fever for 24 hours without the use of fever reducing medications (Tylenol, Motrin, etc).     If you have any questions or concerns, or if I can be of further assistance, please do not hesitate to contact me.     Sincerely,    Gunner Arguello MD

## 2020-11-30 ENCOUNTER — OFFICE VISIT (OUTPATIENT)
Dept: URGENT CARE | Facility: CLINIC | Age: 30
End: 2020-11-30
Payer: COMMERCIAL

## 2020-11-30 VITALS
HEART RATE: 64 BPM | HEIGHT: 72 IN | DIASTOLIC BLOOD PRESSURE: 67 MMHG | OXYGEN SATURATION: 99 % | BODY MASS INDEX: 20.32 KG/M2 | SYSTOLIC BLOOD PRESSURE: 115 MMHG | WEIGHT: 150 LBS | TEMPERATURE: 99 F

## 2020-11-30 DIAGNOSIS — R11.2 NAUSEA AND VOMITING, INTRACTABILITY OF VOMITING NOT SPECIFIED, UNSPECIFIED VOMITING TYPE: Primary | ICD-10-CM

## 2020-11-30 LAB
CTP QC/QA: YES
SARS-COV-2 RDRP RESP QL NAA+PROBE: NEGATIVE

## 2020-11-30 PROCEDURE — 3008F PR BODY MASS INDEX (BMI) DOCUMENTED: ICD-10-PCS | Mod: CPTII,S$GLB,, | Performed by: NURSE PRACTITIONER

## 2020-11-30 PROCEDURE — 99213 OFFICE O/P EST LOW 20 MIN: CPT | Mod: S$GLB,CS,, | Performed by: NURSE PRACTITIONER

## 2020-11-30 PROCEDURE — 99213 PR OFFICE/OUTPT VISIT, EST, LEVL III, 20-29 MIN: ICD-10-PCS | Mod: S$GLB,CS,, | Performed by: NURSE PRACTITIONER

## 2020-11-30 PROCEDURE — 3008F BODY MASS INDEX DOCD: CPT | Mod: CPTII,S$GLB,, | Performed by: NURSE PRACTITIONER

## 2020-11-30 PROCEDURE — U0002 COVID-19 LAB TEST NON-CDC: HCPCS | Mod: QW,S$GLB,, | Performed by: NURSE PRACTITIONER

## 2020-11-30 PROCEDURE — U0002: ICD-10-PCS | Mod: QW,S$GLB,, | Performed by: NURSE PRACTITIONER

## 2020-11-30 RX ORDER — ONDANSETRON 4 MG/1
4 TABLET, ORALLY DISINTEGRATING ORAL EVERY 8 HOURS PRN
Qty: 15 TABLET | Refills: 0 | Status: SHIPPED | OUTPATIENT
Start: 2020-11-30 | End: 2021-07-20

## 2020-11-30 NOTE — PATIENT INSTRUCTIONS
"Return to Urgent Care or go to ER if symptoms worsen or fail to improve.  Follow up with PCP as recommended for further management.       Vomiting (Adult)  Vomiting is a common symptom that may be due to different causes. These include gastroenteritis ("stomach flu"), food poisoning and gastritis. There are other more serious causes of vomiting which may be hard to diagnose early in the illness. Therefore, it is important to watch for the warning signs listed below.  The main danger from repeated vomiting is dehydration. This is due to excess loss of water and minerals from the body. When this occurs, body fluids must be replaced.  Home care  · If symptoms are severe, rest at home for the next 24 hours.  · Because your symptoms may be from an infection, wash your hands frequently and well, and use alcohol-based  to avoid spreading the infection to others.  · Wash your hands for at least 20 seconds. Hum the happy birthday song twice for the correct length of time.  · Wash your hands after using the toilet, before and after preparing food, before eating food, after changing a diaper, cleaning a wound, caring for a sick person, and blowing your nose, coughing, or sneezing. You should also wash your hands after caring for someone who is sick, touching pet food, or treats, and touching an animal, or animal waste.  · You may use acetaminophen or NSAID medicines like ibuprofen or naproxen to control fever, unless another medicine was prescribed. If you have chronic liver or kidney disease or ever had a stomach ulcer or GI bleeding, talk with your doctor before using these medicines. Aspirin should never be used in anyone under 18 years of age who is ill with a fever. It may cause severe liver damage. Don't use NSAID medicines if you are already taking one for another condition (like arthritis) or are on aspirin (such as for heart disease, or after a stroke)  · Avoid tobacco and alcohol use, which may worsen your " symptoms.  · If medicines for vomiting were prescribed, take as directed.  · Once vomiting stops, then follow these guidelines:  During the first 12 to 24 hours follow the diet below:  · Fruit juices. Apple, grape juice, clear fruit drinks, and electrolyte replacement drinks.  · Beverages. Soft drinks without caffeine; mineral water (plain or flavored), decaffeinated tea and coffee.  · Soups. Clear broth, consommé and bouillon  · Desserts. Plain gelatin, popsicles and fruit juice bars. As you feel better, you may add 6-8 ounces of yogurt per day.  During the next 24 hours you may add the following to the above:  · Hot cereal, plain toast, bread, rolls, crackers  · Plain noodles, rice, mashed potatoes, chicken noodle or rice soup  · Unsweetened canned fruit (avoid pineapple), bananas  · Limit caffeine and chocolate. No spices or seasonings except salt.  During the next 24 hours:  Gradually resume a normal diet, as you feel better and your symptoms lessen.  Follow-up care  Follow up with your healthcare provider, or as advised.  When to seek medical advice  Call your healthcare provider right away if any of these occur:  · Constant right-sided lower abdominal pain or increasing general abdominal pain  · Continued vomiting (unable to keep liquids down) for 24 hours  · Frequent diarrhea (more than 5 times a day); blood (red or black color) or mucus in diarrhea  · Reduced urine output or extreme thirst  · Weakness, dizziness or fainting  · Unusually drowsy or confused  · Fever of 100.4°F (38°C) oral or higher, or as directed  · Yellow color of the eyes or skin  Date Last Reviewed: 11/16/2015  © 4216-6308 [employer]. [address] [place] [address], [place] [address]. All rights reserved. This information is not intended as a substitute for professional medical care. Always follow your healthcare professional's instructions.

## 2020-11-30 NOTE — PROGRESS NOTES
Subjective:       Patient ID: Arthur Spaulding is a 30 y.o. male.    Vitals:  height is 6' (1.829 m) and weight is 68 kg (150 lb). His temperature is 98.7 °F (37.1 °C). His blood pressure is 115/67 and his pulse is 64. His oxygen saturation is 99%.     Chief Complaint: Nausea    Nausea  This is a new problem. The current episode started today. The problem has been unchanged. Associated symptoms include myalgias and nausea. Pertinent negatives include no abdominal pain, anorexia, arthralgias, change in bowel habit, chest pain, chills, congestion, coughing, diaphoresis, fatigue, fever, headaches, joint swelling, neck pain, numbness, rash, sore throat, swollen glands, urinary symptoms, vertigo, visual change, vomiting or weakness. He has tried nothing for the symptoms.       Constitution: Negative for chills, sweating, fatigue and fever.   HENT: Negative for congestion and sore throat.    Neck: Negative for neck pain and painful lymph nodes.   Cardiovascular: Negative for chest pain and leg swelling.   Eyes: Negative for double vision and blurred vision.   Respiratory: Negative for cough and shortness of breath.    Gastrointestinal: Positive for nausea. Negative for abdominal pain, vomiting and diarrhea.   Genitourinary: Negative for dysuria, frequency and urgency.   Musculoskeletal: Positive for muscle ache. Negative for joint pain, joint swelling and muscle cramps.   Skin: Negative for color change, pale and rash.   Allergic/Immunologic: Negative for seasonal allergies.   Neurological: Negative for dizziness, history of vertigo, light-headedness, passing out, headaches and numbness.   Hematologic/Lymphatic: Negative for swollen lymph nodes, easy bruising/bleeding and history of blood clots. Does not bruise/bleed easily.   Psychiatric/Behavioral: Negative for nervous/anxious, sleep disturbance and depression. The patient is not nervous/anxious.        Objective:      Physical Exam   Constitutional: He is oriented to  person, place, and time.  Non-toxic appearance. He does not appear ill. No distress.   HENT:   Head: Normocephalic and atraumatic.   Eyes: Conjunctivae are normal. extraocular movement intact  Pulmonary/Chest: Effort normal. No stridor. No respiratory distress.   Abdominal: He exhibits no distension. There is no abdominal tenderness.   Neurological: He is alert and oriented to person, place, and time.   Skin: Skin is not diaphoretic. Psychiatric: His behavior is normal.   Nursing note and vitals reviewed.        Results for orders placed or performed in visit on 11/30/20   POCT COVID-19 Rapid Screening   Result Value Ref Range    POC Rapid COVID Negative Negative     Acceptable Yes        Assessment:       1. Nausea and vomiting, intractability of vomiting not specified, unspecified vomiting type        Plan:         Nausea and vomiting, intractability of vomiting not specified, unspecified vomiting type  -     POCT COVID-19 Rapid Screening  -     ondansetron (ZOFRAN-ODT) 4 MG TbDL; Take 1 tablet (4 mg total) by mouth every 8 (eight) hours as needed (nausea).  Dispense: 15 tablet; Refill: 0

## 2021-01-02 ENCOUNTER — CLINICAL SUPPORT (OUTPATIENT)
Dept: URGENT CARE | Facility: CLINIC | Age: 31
End: 2021-01-02
Payer: COMMERCIAL

## 2021-01-02 DIAGNOSIS — Z11.59 SCREENING FOR VIRAL DISEASE: Primary | ICD-10-CM

## 2021-01-02 LAB
CTP QC/QA: YES
SARS-COV-2 RDRP RESP QL NAA+PROBE: NEGATIVE

## 2021-01-02 PROCEDURE — U0002: ICD-10-PCS | Mod: QW,S$GLB,, | Performed by: NURSE PRACTITIONER

## 2021-01-02 PROCEDURE — U0002 COVID-19 LAB TEST NON-CDC: HCPCS | Mod: QW,S$GLB,, | Performed by: NURSE PRACTITIONER

## 2021-01-29 ENCOUNTER — CLINICAL SUPPORT (OUTPATIENT)
Dept: URGENT CARE | Facility: CLINIC | Age: 31
End: 2021-01-29
Payer: COMMERCIAL

## 2021-01-29 DIAGNOSIS — Z11.59 SCREENING FOR VIRAL DISEASE: Primary | ICD-10-CM

## 2021-01-29 LAB
CTP QC/QA: YES
SARS-COV-2 RDRP RESP QL NAA+PROBE: NEGATIVE

## 2021-01-29 PROCEDURE — U0002 COVID-19 LAB TEST NON-CDC: HCPCS | Mod: QW,S$GLB,, | Performed by: NURSE PRACTITIONER

## 2021-01-29 PROCEDURE — U0002: ICD-10-PCS | Mod: QW,S$GLB,, | Performed by: NURSE PRACTITIONER

## 2021-02-04 ENCOUNTER — CLINICAL SUPPORT (OUTPATIENT)
Dept: URGENT CARE | Facility: CLINIC | Age: 31
End: 2021-02-04
Payer: COMMERCIAL

## 2021-02-04 DIAGNOSIS — Z11.9 ENCOUNTER FOR SCREENING EXAMINATION FOR INFECTIOUS DISEASE: Primary | ICD-10-CM

## 2021-02-04 LAB
CTP QC/QA: YES
SARS-COV-2 RDRP RESP QL NAA+PROBE: NEGATIVE

## 2021-02-04 PROCEDURE — U0002: ICD-10-PCS | Mod: QW,S$GLB,, | Performed by: INTERNAL MEDICINE

## 2021-02-04 PROCEDURE — U0002 COVID-19 LAB TEST NON-CDC: HCPCS | Mod: QW,S$GLB,, | Performed by: INTERNAL MEDICINE

## 2021-04-26 ENCOUNTER — PATIENT MESSAGE (OUTPATIENT)
Dept: RESEARCH | Facility: HOSPITAL | Age: 31
End: 2021-04-26

## 2021-07-17 ENCOUNTER — OFFICE VISIT (OUTPATIENT)
Dept: URGENT CARE | Facility: CLINIC | Age: 31
End: 2021-07-17
Payer: COMMERCIAL

## 2021-07-17 VITALS
TEMPERATURE: 99 F | BODY MASS INDEX: 20.32 KG/M2 | RESPIRATION RATE: 18 BRPM | OXYGEN SATURATION: 99 % | WEIGHT: 150 LBS | HEART RATE: 64 BPM | DIASTOLIC BLOOD PRESSURE: 71 MMHG | SYSTOLIC BLOOD PRESSURE: 110 MMHG | HEIGHT: 72 IN

## 2021-07-17 DIAGNOSIS — N50.811 TESTICULAR PAIN, RIGHT: Primary | ICD-10-CM

## 2021-07-17 LAB
BILIRUB UR QL STRIP: NEGATIVE
GLUCOSE UR QL STRIP: NEGATIVE
KETONES UR QL STRIP: NEGATIVE
LEUKOCYTE ESTERASE UR QL STRIP: NEGATIVE
PH, POC UA: 6 (ref 5–8)
POC BLOOD, URINE: NEGATIVE
POC NITRATES, URINE: NEGATIVE
PROT UR QL STRIP: NEGATIVE
SP GR UR STRIP: 1.02 (ref 1–1.03)
UROBILINOGEN UR STRIP-ACNC: NORMAL (ref 0.3–2.2)

## 2021-07-17 PROCEDURE — 87591 N.GONORRHOEAE DNA AMP PROB: CPT | Performed by: NURSE PRACTITIONER

## 2021-07-17 PROCEDURE — 3008F BODY MASS INDEX DOCD: CPT | Mod: CPTII,S$GLB,, | Performed by: NURSE PRACTITIONER

## 2021-07-17 PROCEDURE — 81003 URINALYSIS AUTO W/O SCOPE: CPT | Mod: QW,S$GLB,, | Performed by: NURSE PRACTITIONER

## 2021-07-17 PROCEDURE — 81003 POCT URINALYSIS, DIPSTICK, AUTOMATED, W/O SCOPE: ICD-10-PCS | Mod: QW,S$GLB,, | Performed by: NURSE PRACTITIONER

## 2021-07-17 PROCEDURE — 87491 CHLMYD TRACH DNA AMP PROBE: CPT | Performed by: NURSE PRACTITIONER

## 2021-07-17 PROCEDURE — 99214 PR OFFICE/OUTPT VISIT, EST, LEVL IV, 30-39 MIN: ICD-10-PCS | Mod: 25,S$GLB,, | Performed by: NURSE PRACTITIONER

## 2021-07-17 PROCEDURE — 3008F PR BODY MASS INDEX (BMI) DOCUMENTED: ICD-10-PCS | Mod: CPTII,S$GLB,, | Performed by: NURSE PRACTITIONER

## 2021-07-17 PROCEDURE — 1125F AMNT PAIN NOTED PAIN PRSNT: CPT | Mod: S$GLB,,, | Performed by: NURSE PRACTITIONER

## 2021-07-17 PROCEDURE — 99214 OFFICE O/P EST MOD 30 MIN: CPT | Mod: 25,S$GLB,, | Performed by: NURSE PRACTITIONER

## 2021-07-17 PROCEDURE — 87086 URINE CULTURE/COLONY COUNT: CPT | Performed by: NURSE PRACTITIONER

## 2021-07-17 PROCEDURE — 1125F PR PAIN SEVERITY QUANTIFIED, PAIN PRESENT: ICD-10-PCS | Mod: S$GLB,,, | Performed by: NURSE PRACTITIONER

## 2021-07-18 LAB — BACTERIA UR CULT: NO GROWTH

## 2021-07-20 ENCOUNTER — OFFICE VISIT (OUTPATIENT)
Dept: UROLOGY | Facility: CLINIC | Age: 31
End: 2021-07-20
Payer: COMMERCIAL

## 2021-07-20 VITALS
BODY MASS INDEX: 20.34 KG/M2 | SYSTOLIC BLOOD PRESSURE: 103 MMHG | WEIGHT: 150 LBS | DIASTOLIC BLOOD PRESSURE: 71 MMHG | HEART RATE: 67 BPM

## 2021-07-20 DIAGNOSIS — N50.811 TESTICULAR PAIN, RIGHT: Primary | ICD-10-CM

## 2021-07-20 PROCEDURE — 1126F PR PAIN SEVERITY QUANTIFIED, NO PAIN PRESENT: ICD-10-PCS | Mod: CPTII,S$GLB,, | Performed by: NURSE PRACTITIONER

## 2021-07-20 PROCEDURE — 1126F AMNT PAIN NOTED NONE PRSNT: CPT | Mod: CPTII,S$GLB,, | Performed by: NURSE PRACTITIONER

## 2021-07-20 PROCEDURE — 99203 OFFICE O/P NEW LOW 30 MIN: CPT | Mod: S$GLB,,, | Performed by: NURSE PRACTITIONER

## 2021-07-20 PROCEDURE — 3008F BODY MASS INDEX DOCD: CPT | Mod: CPTII,S$GLB,, | Performed by: NURSE PRACTITIONER

## 2021-07-20 PROCEDURE — 3008F PR BODY MASS INDEX (BMI) DOCUMENTED: ICD-10-PCS | Mod: CPTII,S$GLB,, | Performed by: NURSE PRACTITIONER

## 2021-07-20 PROCEDURE — 99203 PR OFFICE/OUTPT VISIT, NEW, LEVL III, 30-44 MIN: ICD-10-PCS | Mod: S$GLB,,, | Performed by: NURSE PRACTITIONER

## 2021-07-21 ENCOUNTER — TELEPHONE (OUTPATIENT)
Dept: URGENT CARE | Facility: CLINIC | Age: 31
End: 2021-07-21

## 2021-07-22 LAB
C TRACH DNA SPEC QL NAA+PROBE: NOT DETECTED
N GONORRHOEA DNA SPEC QL NAA+PROBE: NOT DETECTED

## 2021-07-25 ENCOUNTER — TELEPHONE (OUTPATIENT)
Dept: URGENT CARE | Facility: CLINIC | Age: 31
End: 2021-07-25

## 2021-07-26 ENCOUNTER — TELEPHONE (OUTPATIENT)
Dept: URGENT CARE | Facility: CLINIC | Age: 31
End: 2021-07-26

## 2021-12-30 ENCOUNTER — OFFICE VISIT (OUTPATIENT)
Dept: URGENT CARE | Facility: CLINIC | Age: 31
End: 2021-12-30
Payer: COMMERCIAL

## 2021-12-30 VITALS
BODY MASS INDEX: 20.32 KG/M2 | RESPIRATION RATE: 18 BRPM | HEART RATE: 77 BPM | WEIGHT: 150 LBS | SYSTOLIC BLOOD PRESSURE: 118 MMHG | DIASTOLIC BLOOD PRESSURE: 76 MMHG | OXYGEN SATURATION: 100 % | TEMPERATURE: 99 F | HEIGHT: 72 IN

## 2021-12-30 DIAGNOSIS — J30.9 ALLERGIC RHINITIS, UNSPECIFIED SEASONALITY, UNSPECIFIED TRIGGER: Primary | ICD-10-CM

## 2021-12-30 DIAGNOSIS — J45.901 ASTHMA WITH ACUTE EXACERBATION, UNSPECIFIED ASTHMA SEVERITY, UNSPECIFIED WHETHER PERSISTENT: ICD-10-CM

## 2021-12-30 LAB
CTP QC/QA: YES
SARS-COV-2 RDRP RESP QL NAA+PROBE: NEGATIVE

## 2021-12-30 PROCEDURE — U0002: ICD-10-PCS | Mod: QW,S$GLB,, | Performed by: PHYSICIAN ASSISTANT

## 2021-12-30 PROCEDURE — 1159F MED LIST DOCD IN RCRD: CPT | Mod: CPTII,S$GLB,, | Performed by: PHYSICIAN ASSISTANT

## 2021-12-30 PROCEDURE — 3078F PR MOST RECENT DIASTOLIC BLOOD PRESSURE < 80 MM HG: ICD-10-PCS | Mod: CPTII,S$GLB,, | Performed by: PHYSICIAN ASSISTANT

## 2021-12-30 PROCEDURE — 1160F PR REVIEW ALL MEDS BY PRESCRIBER/CLIN PHARMACIST DOCUMENTED: ICD-10-PCS | Mod: CPTII,S$GLB,, | Performed by: PHYSICIAN ASSISTANT

## 2021-12-30 PROCEDURE — 3074F SYST BP LT 130 MM HG: CPT | Mod: CPTII,S$GLB,, | Performed by: PHYSICIAN ASSISTANT

## 2021-12-30 PROCEDURE — 3008F PR BODY MASS INDEX (BMI) DOCUMENTED: ICD-10-PCS | Mod: CPTII,S$GLB,, | Performed by: PHYSICIAN ASSISTANT

## 2021-12-30 PROCEDURE — 3008F BODY MASS INDEX DOCD: CPT | Mod: CPTII,S$GLB,, | Performed by: PHYSICIAN ASSISTANT

## 2021-12-30 PROCEDURE — 3078F DIAST BP <80 MM HG: CPT | Mod: CPTII,S$GLB,, | Performed by: PHYSICIAN ASSISTANT

## 2021-12-30 PROCEDURE — U0002 COVID-19 LAB TEST NON-CDC: HCPCS | Mod: QW,S$GLB,, | Performed by: PHYSICIAN ASSISTANT

## 2021-12-30 PROCEDURE — 1159F PR MEDICATION LIST DOCUMENTED IN MEDICAL RECORD: ICD-10-PCS | Mod: CPTII,S$GLB,, | Performed by: PHYSICIAN ASSISTANT

## 2021-12-30 PROCEDURE — 3074F PR MOST RECENT SYSTOLIC BLOOD PRESSURE < 130 MM HG: ICD-10-PCS | Mod: CPTII,S$GLB,, | Performed by: PHYSICIAN ASSISTANT

## 2021-12-30 PROCEDURE — 99213 PR OFFICE/OUTPT VISIT, EST, LEVL III, 20-29 MIN: ICD-10-PCS | Mod: S$GLB,,, | Performed by: PHYSICIAN ASSISTANT

## 2021-12-30 PROCEDURE — 1160F RVW MEDS BY RX/DR IN RCRD: CPT | Mod: CPTII,S$GLB,, | Performed by: PHYSICIAN ASSISTANT

## 2021-12-30 PROCEDURE — 99213 OFFICE O/P EST LOW 20 MIN: CPT | Mod: S$GLB,,, | Performed by: PHYSICIAN ASSISTANT

## 2021-12-30 RX ORDER — PREDNISONE 20 MG/1
TABLET ORAL
Qty: 8 TABLET | Refills: 0 | Status: SHIPPED | OUTPATIENT
Start: 2021-12-30 | End: 2022-01-04

## 2022-07-05 ENCOUNTER — OFFICE VISIT (OUTPATIENT)
Dept: URGENT CARE | Facility: CLINIC | Age: 32
End: 2022-07-05
Payer: COMMERCIAL

## 2022-07-05 VITALS
HEIGHT: 72 IN | SYSTOLIC BLOOD PRESSURE: 127 MMHG | BODY MASS INDEX: 20.32 KG/M2 | HEART RATE: 89 BPM | DIASTOLIC BLOOD PRESSURE: 68 MMHG | RESPIRATION RATE: 16 BRPM | OXYGEN SATURATION: 100 % | TEMPERATURE: 99 F | WEIGHT: 150 LBS

## 2022-07-05 DIAGNOSIS — R00.2 PALPITATIONS: ICD-10-CM

## 2022-07-05 DIAGNOSIS — F12.10 MILD TETRAHYDROCANNABINOL (THC) ABUSE: ICD-10-CM

## 2022-07-05 DIAGNOSIS — R07.9 CHEST PAIN, UNSPECIFIED TYPE: Primary | ICD-10-CM

## 2022-07-05 PROCEDURE — 1159F PR MEDICATION LIST DOCUMENTED IN MEDICAL RECORD: ICD-10-PCS | Mod: CPTII,S$GLB,, | Performed by: EMERGENCY MEDICINE

## 2022-07-05 PROCEDURE — 1160F PR REVIEW ALL MEDS BY PRESCRIBER/CLIN PHARMACIST DOCUMENTED: ICD-10-PCS | Mod: CPTII,S$GLB,, | Performed by: EMERGENCY MEDICINE

## 2022-07-05 PROCEDURE — 93005 ELECTROCARDIOGRAM TRACING: CPT | Mod: S$GLB,,, | Performed by: EMERGENCY MEDICINE

## 2022-07-05 PROCEDURE — 99215 OFFICE O/P EST HI 40 MIN: CPT | Mod: S$GLB,,, | Performed by: EMERGENCY MEDICINE

## 2022-07-05 PROCEDURE — 3074F SYST BP LT 130 MM HG: CPT | Mod: CPTII,S$GLB,, | Performed by: EMERGENCY MEDICINE

## 2022-07-05 PROCEDURE — 3008F PR BODY MASS INDEX (BMI) DOCUMENTED: ICD-10-PCS | Mod: CPTII,S$GLB,, | Performed by: EMERGENCY MEDICINE

## 2022-07-05 PROCEDURE — 3008F BODY MASS INDEX DOCD: CPT | Mod: CPTII,S$GLB,, | Performed by: EMERGENCY MEDICINE

## 2022-07-05 PROCEDURE — 93010 EKG 12-LEAD: ICD-10-PCS | Mod: S$GLB,,, | Performed by: INTERNAL MEDICINE

## 2022-07-05 PROCEDURE — 99215 PR OFFICE/OUTPT VISIT, EST, LEVL V, 40-54 MIN: ICD-10-PCS | Mod: S$GLB,,, | Performed by: EMERGENCY MEDICINE

## 2022-07-05 PROCEDURE — 3078F PR MOST RECENT DIASTOLIC BLOOD PRESSURE < 80 MM HG: ICD-10-PCS | Mod: CPTII,S$GLB,, | Performed by: EMERGENCY MEDICINE

## 2022-07-05 PROCEDURE — 1160F RVW MEDS BY RX/DR IN RCRD: CPT | Mod: CPTII,S$GLB,, | Performed by: EMERGENCY MEDICINE

## 2022-07-05 PROCEDURE — 3074F PR MOST RECENT SYSTOLIC BLOOD PRESSURE < 130 MM HG: ICD-10-PCS | Mod: CPTII,S$GLB,, | Performed by: EMERGENCY MEDICINE

## 2022-07-05 PROCEDURE — 93005 EKG 12-LEAD: ICD-10-PCS | Mod: S$GLB,,, | Performed by: EMERGENCY MEDICINE

## 2022-07-05 PROCEDURE — 1159F MED LIST DOCD IN RCRD: CPT | Mod: CPTII,S$GLB,, | Performed by: EMERGENCY MEDICINE

## 2022-07-05 PROCEDURE — 3078F DIAST BP <80 MM HG: CPT | Mod: CPTII,S$GLB,, | Performed by: EMERGENCY MEDICINE

## 2022-07-05 PROCEDURE — 93010 ELECTROCARDIOGRAM REPORT: CPT | Mod: S$GLB,,, | Performed by: INTERNAL MEDICINE

## 2022-07-05 RX ORDER — CETIRIZINE HYDROCHLORIDE 10 MG/1
10 TABLET ORAL DAILY
COMMUNITY

## 2022-07-06 NOTE — PATIENT INSTRUCTIONS
Go to the Emergency Room if symptoms or condition worsens in any way    Follow up with Primary Care Provider in 5-7 days      Patient Education        Chest Pain Discharge Instructions   About this topic   Chest pain is felt in the upper part of your body from your neck to your belly. You may feel pain, pressure, or tightness. Many things can cause chest pain. Some are serious things like heart disease or lung disease. Less serious things like stomach problems can also cause chest pain.  The doctors may not be able to find all serious causes of chest pain the first time they see you. It is important that you follow up with your doctor. Treatment will depend on what is causing your chest pain       What care is needed at home?   Ask your doctor what you need to do when you go home. Make sure you ask questions if you do not understand what the doctor says.  Follow your regular doctors orders to keep your blood pressure, cholesterol, and high blood sugar (diabetes) under control.  If you smoke, try to quit. Your doctor or nurse can help.  Keep a healthy weight. If you are too heavy, lose weight.  Eat a healthy diet, as discussed with your doctor or nurse.  What follow-up care is needed?   Your doctor may ask you to make visits to the office to check on your progress. Be sure to keep these visits.  Your doctor will tell you if other tests are needed.  You doctor will tell you if you need to see a specialist like a heart doctor or cardiologist.  Your doctor may order a heart rehab program for you after you leave the hospital. This is an important part of your care. Share your discharge information with the rehab staff so they can plan a program to help you recover. Let your doctor know if you need help finding a program.  What drugs may be needed?   If chest pain is caused by a heart-related problem, the doctor may order drugs to:  Thin the blood  Dissolve a blood clot  Lower cholesterol  Lessen the work of your  heart  Correct or prevent an abnormal heartbeat  Lower your blood pressure  Increase blood flow to the heart muscle  Relax the heart and help avoid spasms in the arteries  Check with your doctor before you take drugs like ibuprofen, naproxen, vitamin, or hormone replacement therapy.  If chest pain is caused by a something other than your heart, the doctor may order drugs to:  Help with pain  Treat stomach problems  Help with breathing  Help you relax  Control coughing  Will physical activity be limited?   Limit activities that can trigger chest pain.  You may need to be less active at first, and then slowly return to normal levels. Talk to your doctor about the right amount of activity for you.  What problems could happen?   Heart attack  Other heart problems  Blood clot in the lungs  When do I need to call the doctor?   Activate the emergency medical system right away if you have signs of a heart attack. Call 911 in the [place] or [place]. The sooner treatment begins, the better your chances for recovery. Call for emergency help right away if you have:  Signs of heart attack, which may include:  Severe chest pain, pressure, or discomfort with:  Breathing trouble; sweating; upset stomach; or cold, clammy skin.  Pain in your arms, back, or jaw.  Worse pain with activity like walking up stairs.  Fast or irregular heartbeat.  Feeling dizzy, faint, or weak.  Do not drive yourself to the hospital or have someone drive you. The emergency rescue people can begin to treat you the minute they arrive.       If you are to take nitroglycerin pills or spray with chest pain:  Rest. Sit or lie down.  Spray or place one pill under your tongue and let it melt.  If the pain is not better or is getting worse after 5 minutes, call for emergency help. Then take one more spray or pill under your tongue.  If the pain does not get better after another 3 to 5 minutes, take a third spray or pill under your tongue.  Drink a sip of water  to wet your mouth if it is too dry to let the pills break down.  If nitroglycerin pills or spray have been ordered, always carry some with you and make sure they are not out of date. They need to be replaced 6 to 12 months after opening the bottle. Keep them in their original bottle and at room temperature. The pill should burn or tingle when you put it under your tongue. If it does not, it may need to be replaced.  Call your doctor if:  You have a fever of 100.4°F (38°C) or higher or chills.  You cough up yellow or green mucus.  You are more tired than normal or more trouble breathing with activity.  Teach Back: Helping You Understand   The Teach Back Method helps you understand the information we are giving you. After you talk with the staff, tell them in your own words what you learned. This helps to make sure the staff has described each thing clearly. It also helps to explain things that may have been confusing. Before going home, make sure you can do these:  I can tell you about my pain.  I can tell you when I can go back to my normal activities.  I can tell you what I will do if I have signs of a heart attack.  Where can I learn more?   American Heart Association  http://www.heart.org/HEARTORG/Conditions/HeartAttack/AboutHeartAttacks/About-Heart-Attacks_UCM_002038_Article.jsp   American Heart Association  http://www.heart.org/HEARTORG/Conditions/HeartAttack/SymptomsDiagnosisofHeartAttack/Angina-[email or web address]_UC_450308_Article.jsp   FamilyDoctor.org  http://familydoctor.org/familydoctor/en/diseases-conditions/angina.printerview.all.html   NHS Choices  https://www.nhs.uk/conditions/chest-pain/   Last Reviewed Date   2021-06-16  Consumer Information Use and Disclaimer   This information is not specific medical advice and does not replace information you receive from your health care provider. This is only a brief summary of general information. It does NOT include all information about conditions, illnesses,  "injuries, tests, procedures, treatments, therapies, discharge instructions or life-style choices that may apply to you. You must talk with your health care provider for complete information about your health and treatment options. This information should not be used to decide whether or not to accept your health care providers advice, instructions or recommendations. Only your health care provider has the knowledge and training to provide advice that is right for you.  Copyright   Copyright © 2021 UpTo[employer], Inc. and its affiliates and/or licensors. All rights reserved.   Patient Education        Marijuana Use and Addiction   The Basics   Written by the doctors and editors at [facility]   What is marijuana? -- Marijuana, also called cannabis, is a drug that comes from the leaves and flowers of a plant. (The plant is also called marijuana.) In the [place], marijuana is usually sold in the form of leaves and buds. In [place], a different form of marijuana is more common. It is called "hashish" and is made from a sticky substance that comes from the plant.  The ingredient in marijuana that makes people feel "high" is called THC. Sometimes people use electronic "vaping" products that contain THC. This can be dangerous and lead to serious lung damage. While all forms of marijuana can cause health problems, this is especially true for vaping. That's because it's not always possible to know what is in these products, or what their long-term effects might be.  Is marijuana legal? -- The answer can be confusing. The [place] federal government considers marijuana to be illegal. At the same time, states have their own laws, and in certain states adults can buy marijuana. This includes other forms of cannabis, such as "edible" products you can eat. In some states, you can only buy marijuana for medical use with a doctor's prescription. In other states, you can buy marijuana as a "recreational" drug.   What does marijuana do to " the brain and body? -- Marijuana can make you feel happy and relaxed. It can also make you feel less anxious, tense, and depressed. But it does bad things, too. It makes it hard to focus or remember things, or to think clearly. Plus, it slows your reaction time and impairs your judgement.  Marijuana can also:  Make your heart race  Raise your blood pressure  Make you breathe faster  Make your eyes red  Make your mouth dry  Make you hungry  Who uses marijuana? -- Marijuana use is common worldwide. About 4 percent of people ages 15 to 64 in the world have used it in the last year. Younger people are more likely than older people to use it, and men are more likely than women to use it.  Does marijuana have any medical benefits? -- Maybe. Marijuana might help ease pain and other symptoms caused by cancer or cancer treatment. It might also help with the symptoms of multiple sclerosis.  Some prescription medicines include the same active ingredients that are in marijuana.  What is cannabis use disorder? -- Cannabis use disorder is basically the medical term for marijuana addiction. People used to think that marijuana was not addictive, but that turns out not to be true. Marijuana addiction does exist, and it can be mild to severe.  People with marijuana addiction have 2 or more of the following problems. The more of these they have, the more severe their disorder.  They end up using more marijuana than they planned to, or they use it for longer than they planned to.  They wish they could cut down on drugs, but they can't.  They spend a lot of time trying to get drugs, getting high, or recovering from being high.  They crave or have a strong desire or urge to use marijuana.  Because of their marijuana use, they often don't do things that are expected of them, such as go to work or school, remember family events, and clean their home.  They keep using marijuana even if it causes or worsens problems in their relationships or  "interactions with other people.  They stop or cut back on important social, work, or fun activities they used to do.  They keep using marijuana even in situations where it is dangerous to do so (such as while driving).  They keep using marijuana even when they know they have a physical or mental problem that was probably caused or made worse by their marijuana use.  They need to smoke more and more to get the same effects they used to get with less. Or they get less effect from using the amount that used to get them high. This is called "tolerance."  They have "withdrawal symptoms" if they stop using marijuana after using it for a long time. Withdrawal symptoms can include:  Irritability, anger, or aggression  Nervousness or anxiety  Trouble sleeping (sometimes because of weird dreams)  Decreased appetite or weight loss  Restlessness  Sadness or depression  At least one of the following physical symptoms: stomach pain, shaking, sweating, fever, chills, headache  How is marijuana addiction treated? -- In general, treatment involves addiction counseling and taking part in a support group.  Addiction counseling - People with addiction work with a counselor to better understand their addiction. They learn new ways to lead their life that do not involve drugs.  Support groups - In support groups, people with addiction share their experiences with each other. The most common of these groups is Marijuana Anonymous (www.marijuana-anonymous.org), but some people dislike that it involves God or a "higher power." There are other groups that do not have that as a focus.  Some people cannot stop using marijuana even with counseling and support groups. They might benefit from a more structured treatment program that includes specific types of therapy, behavioral exercises, and routine drug testing.  There are also a couple of medicines that might help some people who cannot stop using marijuana with the usual treatments. One " medicine is called acetylcysteine (brand name: Acetadote). In the [place] it is sold over-the-counter as a nutritional supplement, but it can also be prescribed by a doctor. The other medicine is gabapentin (sample brand names: Neurontin, Gralise). Gabapentin is sold only with a prescription. It is normally used to treat seizures or pain. There is not a lot of proof that these medicines work, but some experts think it is worth trying one of them.  For specific situations, there are other treatments. An example is family therapy, which can be especially helpful for children or teens with addiction who have families willing to participate in treatment.  All topics are updated as new evidence becomes available and our peer review process is complete.  This topic retrieved from [employer] on: Sep 21, 2021.  Topic 56102 Version 11.0  Release: 29.4.2 - C29.263  © 2021 UpToDate, Inc. and/or its affiliates. All rights reserved.  Consumer Information Use and Disclaimer   This information is not specific medical advice and does not replace information you receive from your health care provider. This is only a brief summary of general information. It does NOT include all information about conditions, illnesses, injuries, tests, procedures, treatments, therapies, discharge instructions or life-style choices that may apply to you. You must talk with your health care provider for complete information about your health and treatment options. This information should not be used to decide whether or not to accept your health care provider's advice, instructions or recommendations. Only your health care provider has the knowledge and training to provide advice that is right for you. The use of this information is governed by the [employer] End User License Agreement, available at https://www.[employer]/en/solutions/[employer]/about/denise.The use of [employer] content is governed by the [employer] Terms of Use. ©2021 UpToDate, Inc. All rights  reserved.  Copyright   © 2021 [employer] and/or its affiliates. All rights reserved.

## 2022-07-06 NOTE — PROGRESS NOTES
"Subjective:       Patient ID: Arthur Spaulding is a 31 y.o. male.    Vitals:  height is 6' (1.829 m) and weight is 68 kg (150 lb). His temperature is 99 °F (37.2 °C). His blood pressure is 127/68 and his pulse is 89. His respiration is 16 and oxygen saturation is 100%.     Chief Complaint: Shortness of Breath    32yo male presents with new onset chest pain and SOB. States he feels like he is "psyching himself out" and making it worse. Patient states his wife recently gave birth to twins. States he also felt bouts of nausea, abdominal pain. Patient states he smoked weed about an hour before the onset of symptoms. States he is a regular cannabis user. Also states he did not eat much today.    Shortness of Breath  This is a new problem. The current episode started today. The problem has been unchanged. Associated symptoms include abdominal pain, chest pain and leg pain (intermittent, resolved). Pertinent negatives include no claudication, coryza, ear pain, fever, headaches, hemoptysis, leg swelling, neck pain, orthopnea, PND, rash, rhinorrhea, sore throat, sputum production, swollen glands, syncope, vomiting or wheezing. The symptoms are aggravated by emotional upset. Treatments tried: rest, deep breathing.       Constitution: Negative for fever.   HENT: Negative for ear pain and sore throat.    Neck: Negative for neck pain.   Cardiovascular: Positive for chest pain. Negative for leg swelling and passing out.   Respiratory: Positive for shortness of breath. Negative for sputum production, bloody sputum and wheezing.    Gastrointestinal: Positive for abdominal pain and diarrhea. Negative for vomiting.   Skin: Negative for rash.   Neurological: Negative for headaches.   Psychiatric/Behavioral: Positive for nervous/anxious. The patient is nervous/anxious.        Objective:      Physical Exam   Constitutional: He is oriented to person, place, and time. He appears well-developed. He is cooperative.  Non-toxic appearance. He does " not appear ill. No distress.   HENT:   Head: Normocephalic and atraumatic.   Ears:   Right Ear: Hearing, tympanic membrane, external ear and ear canal normal.   Left Ear: Hearing, tympanic membrane, external ear and ear canal normal.   Nose: Nose normal. No mucosal edema, rhinorrhea or nasal deformity. No epistaxis. Right sinus exhibits no maxillary sinus tenderness and no frontal sinus tenderness. Left sinus exhibits no maxillary sinus tenderness and no frontal sinus tenderness.   Mouth/Throat: Uvula is midline, oropharynx is clear and moist and mucous membranes are normal. No trismus in the jaw. Normal dentition. No uvula swelling. No posterior oropharyngeal erythema.   Eyes: Conjunctivae and lids are normal. Right eye exhibits no discharge. Left eye exhibits no discharge. No scleral icterus.   Neck: Trachea normal and phonation normal. Neck supple.   Cardiovascular: Normal rate, regular rhythm, normal heart sounds and normal pulses.  No extrasystoles are present. No thrill     Comments: Negative Homans sign bilateral   Pulmonary/Chest: Effort normal and breath sounds normal. No respiratory distress.   Abdominal: Normal appearance and bowel sounds are normal. He exhibits no distension, no pulsatile midline mass and no mass. Soft. There is no abdominal tenderness.   Musculoskeletal: Normal range of motion.         General: No deformity. Normal range of motion.      Right lower leg: No edema.      Left lower leg: No edema.   Neurological: He is alert and oriented to person, place, and time. He exhibits normal muscle tone. Coordination normal.   Skin: Skin is warm, dry, intact, not diaphoretic and not pale.   Psychiatric: His speech is normal and behavior is normal. Judgment and thought content normal.   Nursing note and vitals reviewed.        Assessment:       1. Chest pain, unspecified type    2. Palpitations    3. Mild tetrahydrocannabinol (THC) abuse          Plan:         Chest pain, unspecified type  -     IN  OFFICE EKG 12-LEAD (to [facility])    Palpitations    Mild tetrahydrocannabinol (THC) abuse                 EKG interpretation:  Normal sinus rhythm heart rate is 89 beats per minute there are no acute ischemic changes is a normal axis normal intervals time is     Medical decision makin year-old male presents to clinic today for assessment of chest pain anxiety and nausea that occurred after smoking marijuana earlier today.  Patient reports that he began to feel like there was a sharp pain to his left upper chest.  He then began to feel palpitations.  He says his heart rate elevated into the 120s at 1 point.  He denies inhaling albuterol which she uses for asthma.  Patient denies cardiac risk factors.  He has no history of hypertension unknown cholesterol but he says his diet is not the best.  Denies a family history of heart attack or sudden death.  By the time of his arrival here his heart rate come down initially to the 1 teens and then into the 80s.  His physical exam findings revealed normal vital signs.  Patient was in no distress cardio pulmonary exam was benign.  Testing in the clinic today included an EKG which showed a sinus rhythm without ischemic changes or arrhythmia.  At this time patient based on patient's description of symptoms age and lack of risk factors I do not feel that he is at risk for acute coronary syndrome.  Feel that his symptoms are secondary to marijuana inhalation and anxiety.  I strongly recommended that he discontinue marijuana use at home.  I explained him the dangers of marijuana including affects on the GI tract long-term.  I have advised patient that should symptoms persist after discontinuing marijuana use he may consider follow-up with primary care provider for outpatient Holter monitoring.  Patient advised warning signs and symptoms for which to go to the emergency room.  He is otherwise instructed to rest and drink plenty of fluids.  Patient and family understand  instructions and agree with plan at this time.        Patient Instructions   Go to the Emergency Room if symptoms or condition worsens in any way    Follow up with Primary Care Provider in 5-7 days      Patient Education        Chest Pain Discharge Instructions   About this topic   Chest pain is felt in the upper part of your body from your neck to your belly. You may feel pain, pressure, or tightness. Many things can cause chest pain. Some are serious things like heart disease or lung disease. Less serious things like stomach problems can also cause chest pain.  The doctors may not be able to find all serious causes of chest pain the first time they see you. It is important that you follow up with your doctor. Treatment will depend on what is causing your chest pain       What care is needed at home?   · Ask your doctor what you need to do when you go home. Make sure you ask questions if you do not understand what the doctor says.  · Follow your regular doctors orders to keep your blood pressure, cholesterol, and high blood sugar (diabetes) under control.  · If you smoke, try to quit. Your doctor or nurse can help.  · Keep a healthy weight. If you are too heavy, lose weight.  · Eat a healthy diet, as discussed with your doctor or nurse.  What follow-up care is needed?   · Your doctor may ask you to make visits to the office to check on your progress. Be sure to keep these visits.  · Your doctor will tell you if other tests are needed.  · You doctor will tell you if you need to see a specialist like a heart doctor or cardiologist.  · Your doctor may order a heart rehab program for you after you leave the hospital. This is an important part of your care. Share your discharge information with the rehab staff so they can plan a program to help you recover. Let your doctor know if you need help finding a program.  What drugs may be needed?   If chest pain is caused by a heart-related problem, the doctor may order drugs  to:  · Thin the blood  · Dissolve a blood clot  · Lower cholesterol  · Lessen the work of your heart  · Correct or prevent an abnormal heartbeat  · Lower your blood pressure  · Increase blood flow to the heart muscle  · Relax the heart and help avoid spasms in the arteries  Check with your doctor before you take drugs like ibuprofen, naproxen, vitamin, or hormone replacement therapy.  If chest pain is caused by a something other than your heart, the doctor may order drugs to:  · Help with pain  · Treat stomach problems  · Help with breathing  · Help you relax  · Control coughing  Will physical activity be limited?   · Limit activities that can trigger chest pain.  · You may need to be less active at first, and then slowly return to normal levels. Talk to your doctor about the right amount of activity for you.  What problems could happen?   · Heart attack  · Other heart problems  · Blood clot in the lungs  When do I need to call the doctor?   Activate the emergency medical system right away if you have signs of a heart attack. Call 911 in the [place] or [place]. The sooner treatment begins, the better your chances for recovery. Call for emergency help right away if you have:  1. Signs of heart attack, which may include:  1. Severe chest pain, pressure, or discomfort with:  § Breathing trouble; sweating; upset stomach; or cold, clammy skin.  § Pain in your arms, back, or jaw.  § Worse pain with activity like walking up stairs.  2. Fast or irregular heartbeat.  3. Feeling dizzy, faint, or weak.  4. Do not drive yourself to the hospital or have someone drive you. The emergency rescue people can begin to treat you the minute they arrive.       1. If you are to take nitroglycerin pills or spray with chest pain:  ? Rest. Sit or lie down.  ? Spray or place one pill under your tongue and let it melt.  ? If the pain is not better or is getting worse after 5 minutes, call for emergency help. Then take one more spray or  pill under your tongue.  ? If the pain does not get better after another 3 to 5 minutes, take a third spray or pill under your tongue.  ? Drink a sip of water to wet your mouth if it is too dry to let the pills break down.  · If nitroglycerin pills or spray have been ordered, always carry some with you and make sure they are not out of date. They need to be replaced 6 to 12 months after opening the bottle. Keep them in their original bottle and at room temperature. The pill should burn or tingle when you put it under your tongue. If it does not, it may need to be replaced.  Call your doctor if:  · You have a fever of 100.4°F (38°C) or higher or chills.  · You cough up yellow or green mucus.  · You are more tired than normal or more trouble breathing with activity.  Teach Back: Helping You Understand   The Teach Back Method helps you understand the information we are giving you. After you talk with the staff, tell them in your own words what you learned. This helps to make sure the staff has described each thing clearly. It also helps to explain things that may have been confusing. Before going home, make sure you can do these:  · I can tell you about my pain.  · I can tell you when I can go back to my normal activities.  · I can tell you what I will do if I have signs of a heart attack.  Where can I learn more?   American Heart Association  http://www.heart.org/HEARTORG/Conditions/HeartAttack/AboutHeartAttacks/About-Heart-Attacks_UCM_002038_Article.jsp   American Heart Association  http://www.heart.org/HEARTORG/Conditions/HeartAttack/SymptomsDiagnosisofHeartAttack/Angina-[email or web address]_UCM_450308_Article.jsp   FamilyDoctor.org  http://familydoctor.org/familydoctor/en/diseases-conditions/angina.printerview.all.html   NHS Choices  https://www.nhs.uk/conditions/chest-pain/   Last Reviewed Date   2021-06-16  Consumer Information Use and Disclaimer   This information is not specific medical advice and does not replace  "information you receive from your health care provider. This is only a brief summary of general information. It does NOT include all information about conditions, illnesses, injuries, tests, procedures, treatments, therapies, discharge instructions or life-style choices that may apply to you. You must talk with your health care provider for complete information about your health and treatment options. This information should not be used to decide whether or not to accept your health care providers advice, instructions or recommendations. Only your health care provider has the knowledge and training to provide advice that is right for you.  Copyright   Copyright © 2021 UpToDate, Inc. and its affiliates and/or licensors. All rights reserved.   Patient Education        Marijuana Use and Addiction   The Basics   Written by the doctors and editors at [employer]   What is marijuana? -- Marijuana, also called cannabis, is a drug that comes from the leaves and flowers of a plant. (The plant is also called marijuana.) In the [place], marijuana is usually sold in the form of leaves and buds. In [place], a different form of marijuana is more common. It is called "hashish" and is made from a sticky substance that comes from the plant.  The ingredient in marijuana that makes people feel "high" is called THC. Sometimes people use electronic "vaping" products that contain THC. This can be dangerous and lead to serious lung damage. While all forms of marijuana can cause health problems, this is especially true for vaping. That's because it's not always possible to know what is in these products, or what their long-term effects might be.  Is marijuana legal? -- The answer can be confusing. The [place] federal government considers marijuana to be illegal. At the same time, states have their own laws, and in certain states adults can buy marijuana. This includes other forms of cannabis, such as "edible" products you can eat. In some " "states, you can only buy marijuana for medical use with a doctor's prescription. In other states, you can buy marijuana as a "recreational" drug.   What does marijuana do to the brain and body? -- Marijuana can make you feel happy and relaxed. It can also make you feel less anxious, tense, and depressed. But it does bad things, too. It makes it hard to focus or remember things, or to think clearly. Plus, it slows your reaction time and impairs your judgement.  Marijuana can also:  · Make your heart race  · Raise your blood pressure  · Make you breathe faster  · Make your eyes red  · Make your mouth dry  · Make you hungry  Who uses marijuana? -- Marijuana use is common worldwide. About 4 percent of people ages 15 to 64 in the world have used it in the last year. Younger people are more likely than older people to use it, and men are more likely than women to use it.  Does marijuana have any medical benefits? -- Maybe. Marijuana might help ease pain and other symptoms caused by cancer or cancer treatment. It might also help with the symptoms of multiple sclerosis.  Some prescription medicines include the same active ingredients that are in marijuana.  What is cannabis use disorder? -- Cannabis use disorder is basically the medical term for marijuana addiction. People used to think that marijuana was not addictive, but that turns out not to be true. Marijuana addiction does exist, and it can be mild to severe.  People with marijuana addiction have 2 or more of the following problems. The more of these they have, the more severe their disorder.  1. They end up using more marijuana than they planned to, or they use it for longer than they planned to.  2. They wish they could cut down on drugs, but they can't.  3. They spend a lot of time trying to get drugs, getting high, or recovering from being high.  4. They crave or have a strong desire or urge to use marijuana.  5. Because of their marijuana use, they often don't do " "things that are expected of them, such as go to work or school, remember family events, and clean their home.  6. They keep using marijuana even if it causes or worsens problems in their relationships or interactions with other people.  7. They stop or cut back on important social, work, or fun activities they used to do.  8. They keep using marijuana even in situations where it is dangerous to do so (such as while driving).  9. They keep using marijuana even when they know they have a physical or mental problem that was probably caused or made worse by their marijuana use.  10. They need to smoke more and more to get the same effects they used to get with less. Or they get less effect from using the amount that used to get them high. This is called "tolerance."  11. They have "withdrawal symptoms" if they stop using marijuana after using it for a long time. Withdrawal symptoms can include:  ? Irritability, anger, or aggression  ? Nervousness or anxiety  ? Trouble sleeping (sometimes because of weird dreams)  ? Decreased appetite or weight loss  ? Restlessness  ? Sadness or depression  ? At least one of the following physical symptoms: stomach pain, shaking, sweating, fever, chills, headache  How is marijuana addiction treated? -- In general, treatment involves addiction counseling and taking part in a support group.  · Addiction counseling - People with addiction work with a counselor to better understand their addiction. They learn new ways to lead their life that do not involve drugs.  · Support groups - In support groups, people with addiction share their experiences with each other. The most common of these groups is Marijuana Anonymous (www.marijuana-anonymous.org), but some people dislike that it involves God or a "higher power." There are other groups that do not have that as a focus.  Some people cannot stop using marijuana even with counseling and support groups. They might benefit from a more structured " treatment program that includes specific types of therapy, behavioral exercises, and routine drug testing.  There are also a couple of medicines that might help some people who cannot stop using marijuana with the usual treatments. One medicine is called acetylcysteine (brand name: Acetadote). In the [place] it is sold over-the-counter as a nutritional supplement, but it can also be prescribed by a doctor. The other medicine is gabapentin (sample brand names: Neurontin, Gralise). Gabapentin is sold only with a prescription. It is normally used to treat seizures or pain. There is not a lot of proof that these medicines work, but some experts think it is worth trying one of them.  For specific situations, there are other treatments. An example is family therapy, which can be especially helpful for children or teens with addiction who have families willing to participate in treatment.  All topics are updated as new evidence becomes available and our peer review process is complete.  This topic retrieved from [employer] on: Sep 21, 2021.  Topic 75369 Version 11.0  Release: 29.4.2 - C29.263  © 2021 UpToDate, Inc. and/or its affiliates. All rights reserved.  Consumer Information Use and Disclaimer   This information is not specific medical advice and does not replace information you receive from your health care provider. This is only a brief summary of general information. It does NOT include all information about conditions, illnesses, injuries, tests, procedures, treatments, therapies, discharge instructions or life-style choices that may apply to you. You must talk with your health care provider for complete information about your health and treatment options. This information should not be used to decide whether or not to accept your health care provider's advice, instructions or recommendations. Only your health care provider has the knowledge and training to provide advice that is right for you. The use of this  information is governed by the [facility] End User License Agreement, available at https://www.[employer]CH[employer].Novint/en/solutions/PicBadges/about/denise.The use of [employer] content is governed by the [employer] Terms of Use. ©2021 UpToDate, Inc. All rights reserved.  Copyright   © 2021 UpToDate, Inc. and/or its affiliates. All rights reserved.

## 2022-12-24 ENCOUNTER — OFFICE VISIT (OUTPATIENT)
Dept: URGENT CARE | Facility: CLINIC | Age: 32
End: 2022-12-24
Payer: COMMERCIAL

## 2022-12-24 VITALS
HEART RATE: 75 BPM | DIASTOLIC BLOOD PRESSURE: 67 MMHG | BODY MASS INDEX: 20.32 KG/M2 | RESPIRATION RATE: 16 BRPM | OXYGEN SATURATION: 99 % | WEIGHT: 150 LBS | SYSTOLIC BLOOD PRESSURE: 109 MMHG | TEMPERATURE: 98 F | HEIGHT: 72 IN

## 2022-12-24 DIAGNOSIS — R05.1 ACUTE COUGH: ICD-10-CM

## 2022-12-24 DIAGNOSIS — H65.02 NON-RECURRENT ACUTE SEROUS OTITIS MEDIA OF LEFT EAR: Primary | ICD-10-CM

## 2022-12-24 PROBLEM — J30.81 ALLERGIC RHINITIS DUE TO ANIMAL HAIR AND DANDER: Status: ACTIVE | Noted: 2022-12-24

## 2022-12-24 PROBLEM — J30.1 ALLERGIC RHINITIS DUE TO POLLEN: Status: ACTIVE | Noted: 2022-12-24

## 2022-12-24 PROBLEM — H10.10 ACUTE ATOPIC CONJUNCTIVITIS: Status: ACTIVE | Noted: 2022-12-24

## 2022-12-24 PROBLEM — J30.9 ALLERGIC RHINITIS: Status: ACTIVE | Noted: 2022-12-24

## 2022-12-24 PROBLEM — J45.909 ASTHMA WITHOUT STATUS ASTHMATICUS: Status: ACTIVE | Noted: 2022-12-24

## 2022-12-24 PROBLEM — J45.30 MILD PERSISTENT ASTHMA WITHOUT COMPLICATION: Status: ACTIVE | Noted: 2022-12-24

## 2022-12-24 LAB
CTP QC/QA: YES
CTP QC/QA: YES
POC MOLECULAR INFLUENZA A AGN: NEGATIVE
POC MOLECULAR INFLUENZA B AGN: NEGATIVE
SARS-COV-2 RDRP RESP QL NAA+PROBE: NEGATIVE

## 2022-12-24 PROCEDURE — 3078F PR MOST RECENT DIASTOLIC BLOOD PRESSURE < 80 MM HG: ICD-10-PCS | Mod: CPTII,S$GLB,, | Performed by: FAMILY MEDICINE

## 2022-12-24 PROCEDURE — 1160F PR REVIEW ALL MEDS BY PRESCRIBER/CLIN PHARMACIST DOCUMENTED: ICD-10-PCS | Mod: CPTII,S$GLB,, | Performed by: FAMILY MEDICINE

## 2022-12-24 PROCEDURE — 99213 PR OFFICE/OUTPT VISIT, EST, LEVL III, 20-29 MIN: ICD-10-PCS | Mod: S$GLB,,, | Performed by: FAMILY MEDICINE

## 2022-12-24 PROCEDURE — 1159F MED LIST DOCD IN RCRD: CPT | Mod: CPTII,S$GLB,, | Performed by: FAMILY MEDICINE

## 2022-12-24 PROCEDURE — U0002: ICD-10-PCS | Mod: QW,S$GLB,, | Performed by: FAMILY MEDICINE

## 2022-12-24 PROCEDURE — 3008F PR BODY MASS INDEX (BMI) DOCUMENTED: ICD-10-PCS | Mod: CPTII,S$GLB,, | Performed by: FAMILY MEDICINE

## 2022-12-24 PROCEDURE — 3074F PR MOST RECENT SYSTOLIC BLOOD PRESSURE < 130 MM HG: ICD-10-PCS | Mod: CPTII,S$GLB,, | Performed by: FAMILY MEDICINE

## 2022-12-24 PROCEDURE — 3074F SYST BP LT 130 MM HG: CPT | Mod: CPTII,S$GLB,, | Performed by: FAMILY MEDICINE

## 2022-12-24 PROCEDURE — 87502 INFLUENZA DNA AMP PROBE: CPT | Mod: QW,S$GLB,, | Performed by: FAMILY MEDICINE

## 2022-12-24 PROCEDURE — 3008F BODY MASS INDEX DOCD: CPT | Mod: CPTII,S$GLB,, | Performed by: FAMILY MEDICINE

## 2022-12-24 PROCEDURE — 99213 OFFICE O/P EST LOW 20 MIN: CPT | Mod: S$GLB,,, | Performed by: FAMILY MEDICINE

## 2022-12-24 PROCEDURE — U0002 COVID-19 LAB TEST NON-CDC: HCPCS | Mod: QW,S$GLB,, | Performed by: FAMILY MEDICINE

## 2022-12-24 PROCEDURE — 87502 POCT INFLUENZA A/B MOLECULAR: ICD-10-PCS | Mod: QW,S$GLB,, | Performed by: FAMILY MEDICINE

## 2022-12-24 PROCEDURE — 1159F PR MEDICATION LIST DOCUMENTED IN MEDICAL RECORD: ICD-10-PCS | Mod: CPTII,S$GLB,, | Performed by: FAMILY MEDICINE

## 2022-12-24 PROCEDURE — 3078F DIAST BP <80 MM HG: CPT | Mod: CPTII,S$GLB,, | Performed by: FAMILY MEDICINE

## 2022-12-24 PROCEDURE — 1160F RVW MEDS BY RX/DR IN RCRD: CPT | Mod: CPTII,S$GLB,, | Performed by: FAMILY MEDICINE

## 2022-12-24 RX ORDER — AMOXICILLIN AND CLAVULANATE POTASSIUM 875; 125 MG/1; MG/1
1 TABLET, FILM COATED ORAL 2 TIMES DAILY
Qty: 20 TABLET | Refills: 0 | Status: SHIPPED | OUTPATIENT
Start: 2022-12-24 | End: 2023-01-03

## 2022-12-24 RX ORDER — BENZONATATE 100 MG/1
CAPSULE ORAL
Qty: 30 CAPSULE | Refills: 1 | Status: SHIPPED | OUTPATIENT
Start: 2022-12-24

## 2022-12-24 NOTE — PROGRESS NOTES
Subjective:       Patient ID: Arthur Spaulding is a 32 y.o. male.    Vitals:  height is 6' (1.829 m) and weight is 68 kg (150 lb). His temperature is 98.4 °F (36.9 °C). His blood pressure is 109/67 and his pulse is 75. His respiration is 16 and oxygen saturation is 99%.     Chief Complaint: Otalgia    33yo male presents with c/o L ear pain and hearing loss. States he has also had a cough for about 2 weeks. He seemed a bit better and now in past few days his cough has gotten worse and he developed severe left ear pain. His daughters were diagnosed with COVID last week.    Otalgia   There is pain in the left ear. This is a new problem. The current episode started yesterday. The problem has been gradually worsening. There has been no fever. The pain is at a severity of 4/10. The pain is moderate. Associated symptoms include coughing (2 weeks, productive), ear discharge (orange) and hearing loss. Pertinent negatives include no abdominal pain, diarrhea, headaches, neck pain, rash, rhinorrhea, sore throat or vomiting. He has tried acetaminophen (sudafed) for the symptoms. The treatment provided moderate relief.     HENT:  Positive for ear pain, ear discharge (orange) and hearing loss. Negative for sore throat.    Neck: Negative for neck pain.   Respiratory:  Positive for cough (2 weeks, productive).    Gastrointestinal:  Negative for abdominal pain, vomiting and diarrhea.   Skin:  Negative for rash.   Neurological:  Negative for headaches.     Objective:      Physical Exam   Constitutional: He is oriented to person, place, and time. He appears well-developed. He is cooperative.  Non-toxic appearance. He does not appear ill. No distress.   HENT:   Head: Normocephalic and atraumatic.   Ears:   Right Ear: Hearing, tympanic membrane, external ear and ear canal normal.   Left Ear: Hearing, external ear and ear canal normal.      Comments: Left TM is bulging and red with blood in and behind  membrane   Nose: Congestion present. No  mucosal edema, rhinorrhea or nasal deformity. No epistaxis. Right sinus exhibits no maxillary sinus tenderness and no frontal sinus tenderness. Left sinus exhibits no maxillary sinus tenderness and no frontal sinus tenderness.   Mouth/Throat: Uvula is midline, oropharynx is clear and moist and mucous membranes are normal. Mucous membranes are moist. No trismus in the jaw. Normal dentition. No uvula swelling. No oropharyngeal exudate, posterior oropharyngeal edema or posterior oropharyngeal erythema. Oropharynx is clear.   Eyes: Conjunctivae and lids are normal. No scleral icterus.   Neck: Trachea normal and phonation normal. Neck supple. No edema present. No erythema present. No neck rigidity present.   Cardiovascular: Normal rate, regular rhythm, normal heart sounds and normal pulses.   Pulmonary/Chest: Effort normal and breath sounds normal. No respiratory distress. He has no decreased breath sounds. He has no rhonchi.   Abdominal: Normal appearance.   Musculoskeletal: Normal range of motion.         General: No deformity. Normal range of motion.   Lymphadenopathy:     He has cervical adenopathy.   Neurological: He is alert and oriented to person, place, and time. He exhibits normal muscle tone. Coordination normal.   Skin: Skin is warm, dry, intact, not diaphoretic and not pale.   Psychiatric: His speech is normal and behavior is normal. Judgment and thought content normal.   Nursing note and vitals reviewed.      Assessment:       1. Non-recurrent acute serous otitis media of left ear    2. Acute cough          Plan:         Non-recurrent acute serous otitis media of left ear  -     amoxicillin-clavulanate 875-125mg (AUGMENTIN) 875-125 mg per tablet; Take 1 tablet by mouth 2 (two) times daily. for 10 days  Dispense: 20 tablet; Refill: 0    Acute cough  -     POCT COVID-19 Rapid Screening  -     POCT Influenza A/B MOLECULAR  -     benzonatate (TESSALON PERLES) 100 MG capsule; 1 or 2 every 8 hours prn cough   Dispense: 30 capsule; Refill: 1       Pt or guardian provided educational materials and instructions regarding their visit diagnosis.

## 2024-04-29 ENCOUNTER — OFFICE VISIT (OUTPATIENT)
Dept: URGENT CARE | Facility: CLINIC | Age: 34
End: 2024-04-29
Payer: COMMERCIAL

## 2024-04-29 VITALS
RESPIRATION RATE: 18 BRPM | OXYGEN SATURATION: 98 % | WEIGHT: 150 LBS | HEART RATE: 67 BPM | HEIGHT: 72 IN | SYSTOLIC BLOOD PRESSURE: 104 MMHG | BODY MASS INDEX: 20.32 KG/M2 | TEMPERATURE: 98 F | DIASTOLIC BLOOD PRESSURE: 72 MMHG

## 2024-04-29 DIAGNOSIS — M79.644 PAIN OF RIGHT MIDDLE FINGER: ICD-10-CM

## 2024-04-29 DIAGNOSIS — S69.91XA INJURY OF RIGHT MIDDLE FINGER, INITIAL ENCOUNTER: Primary | ICD-10-CM

## 2024-04-29 DIAGNOSIS — M79.89 SWELLING OF RIGHT MIDDLE FINGER: ICD-10-CM

## 2024-04-29 PROCEDURE — 73140 X-RAY EXAM OF FINGER(S): CPT | Mod: RT,S$GLB,, | Performed by: INTERNAL MEDICINE

## 2024-04-29 PROCEDURE — 99213 OFFICE O/P EST LOW 20 MIN: CPT | Mod: S$GLB,,, | Performed by: NURSE PRACTITIONER

## 2024-04-29 NOTE — PROGRESS NOTES
Subjective:      Patient ID: Arthur Spaulding is a 33 y.o. male.    Vitals:  height is 6' (1.829 m) and weight is 68 kg (150 lb). His oral temperature is 98.4 °F (36.9 °C). His blood pressure is 104/72 and his pulse is 67. His respiration is 18 and oxygen saturation is 98%.     Chief Complaint: Hand Injury    Pt states he injured his right middle finger on Sunday while play fighting with his nephew, states it is a little discolored around his knuckle.    33-year-old male presents to clinic with complaints of pain and swelling to right middle finger after an injury 2 days ago. Reports taping middle and 4th fingers together which provided some relief.          Hand Injury   His dominant hand is their right hand. Incident onset: yesterday. The incident occurred at home. The pain is at a severity of 3/10. Pertinent negatives include no numbness.       Musculoskeletal:  Positive for pain, trauma, joint pain, joint swelling and muscle ache. Negative for abnormal ROM of joint.   Skin:  Positive for bruising. Negative for erythema.   Neurological:  Negative for numbness and tingling.      Objective:     Physical Exam   Constitutional: He is oriented to person, place, and time. He appears well-developed.   HENT:   Head: Normocephalic and atraumatic. Head is without abrasion, without contusion and without laceration.   Ears:   Right Ear: External ear normal.   Left Ear: External ear normal.   Nose: Nose normal.   Mouth/Throat: Oropharynx is clear and moist and mucous membranes are normal.   Eyes: EOM and lids are normal. Extraocular movement intact   Neck: Trachea normal and phonation normal. Neck supple.   Cardiovascular: Normal rate.   Pulmonary/Chest: Effort normal.   Musculoskeletal: Normal range of motion.         General: Normal range of motion.      Right hand: Right middle finger: Exhibits tenderness and swelling. There is tenderness of the MCP joint. Motor /Testing: The patient has normal right wrist strength.    Neurological: He is alert and oriented to person, place, and time.   Skin: Skin is warm, dry, intact and no rash. Capillary refill takes less than 2 seconds. No abrasion, No burn, No bruising, No erythema and No ecchymosis   Psychiatric: His speech is normal and behavior is normal. Judgment and thought content normal.   Nursing note and vitals reviewed.      Assessment:     1. Injury of right middle finger, initial encounter    2. Pain of right middle finger    3. Swelling of right middle finger        Plan:       Injury of right middle finger, initial encounter  -     X-Ray Finger 2 or More Views Right; Future; Expected date: 04/29/2024    Pain of right middle finger  -     X-Ray Finger 2 or More Views Right; Future; Expected date: 04/29/2024    Swelling of right middle finger      X-Ray Finger 2 or More Views Right    Result Date: 4/29/2024  EXAMINATION: XR FINGER 2 OR MORE VIEWS RIGHT CLINICAL HISTORY: Unspecified injury of right wrist, hand and finger(s), initial encounter TECHNIQUE: Three views right 3rd finger COMPARISON: None FINDINGS: There is no evidence of fracture or osseous destructive process.  No significant degenerative changes are present.     As above Electronically signed by: Antionette Cornejo MD Date:    04/29/2024 Time:    13:55      Reviewed xray with patient who acknowledged results. Discussed  Diagnosis and treatment plan with patient who verbalized understanding and agrees with plan of care.  Patient given educational handouts supporting this diagnosis as well.  Patient denies any further questions or concerns at this time.  Patient exits exam room in no acute distress.           Patient Instructions   Please drink plenty of fluids.  Please get plenty of rest.  Please return here or go to the Emergency Department for any concerns or worsening of condition.  If you were not prescribed an anti-inflammatory medication, it is OK to take over the counter Ibuprofen or Advil or Motrin or Aleve as  directed.  Do not take these medications on an empty stomach.  Rest, ice, compression and elevation to the affected joint or limb as needed.  Please follow up with your primary care doctor or specialist as needed.    If you  smoke, please stop smoking.

## 2024-04-29 NOTE — PATIENT INSTRUCTIONS
Please drink plenty of fluids.  Please get plenty of rest.  Please return here or go to the Emergency Department for any concerns or worsening of condition.  If you were not prescribed an anti-inflammatory medication, it is OK to take over the counter Ibuprofen or Advil or Motrin or Aleve as directed.  Do not take these medications on an empty stomach.  Rest, ice, compression and elevation to the affected joint or limb as needed.  Please follow up with your primary care doctor or specialist as needed.    If you  smoke, please stop smoking.

## 2025-06-02 ENCOUNTER — OFFICE VISIT (OUTPATIENT)
Dept: URGENT CARE | Facility: CLINIC | Age: 35
End: 2025-06-02
Payer: COMMERCIAL

## 2025-06-02 VITALS
BODY MASS INDEX: 20.31 KG/M2 | TEMPERATURE: 98 F | SYSTOLIC BLOOD PRESSURE: 115 MMHG | WEIGHT: 149.94 LBS | OXYGEN SATURATION: 99 % | HEIGHT: 72 IN | DIASTOLIC BLOOD PRESSURE: 75 MMHG | HEART RATE: 68 BPM | RESPIRATION RATE: 18 BRPM

## 2025-06-02 DIAGNOSIS — L03.011 PARONYCHIA OF RIGHT MIDDLE FINGER: Primary | ICD-10-CM

## 2025-06-02 DIAGNOSIS — M79.89 SWOLLEN FINGER: ICD-10-CM

## 2025-06-02 DIAGNOSIS — M79.644 PAIN OF RIGHT MIDDLE FINGER: ICD-10-CM

## 2025-06-02 PROCEDURE — 99213 OFFICE O/P EST LOW 20 MIN: CPT | Mod: S$GLB,,, | Performed by: NURSE PRACTITIONER

## 2025-06-02 RX ORDER — MUPIROCIN 20 MG/G
OINTMENT TOPICAL 3 TIMES DAILY PRN
Qty: 22 G | Refills: 0 | Status: SHIPPED | OUTPATIENT
Start: 2025-06-02

## 2025-06-02 RX ORDER — BUPROPION HYDROCHLORIDE 150 MG/1
150 TABLET ORAL
COMMUNITY
Start: 2025-05-28

## 2025-06-02 RX ORDER — AMOXICILLIN AND CLAVULANATE POTASSIUM 875; 125 MG/1; MG/1
1 TABLET, FILM COATED ORAL EVERY 12 HOURS
Qty: 14 TABLET | Refills: 0 | Status: SHIPPED | OUTPATIENT
Start: 2025-06-02 | End: 2025-06-09